# Patient Record
Sex: FEMALE | Race: WHITE | NOT HISPANIC OR LATINO | URBAN - METROPOLITAN AREA
[De-identification: names, ages, dates, MRNs, and addresses within clinical notes are randomized per-mention and may not be internally consistent; named-entity substitution may affect disease eponyms.]

---

## 2017-04-12 ENCOUNTER — OUTPATIENT (OUTPATIENT)
Dept: OUTPATIENT SERVICES | Facility: HOSPITAL | Age: 22
LOS: 1 days | End: 2017-04-12
Payer: COMMERCIAL

## 2017-04-12 VITALS
OXYGEN SATURATION: 99 % | DIASTOLIC BLOOD PRESSURE: 79 MMHG | TEMPERATURE: 98 F | WEIGHT: 115.08 LBS | RESPIRATION RATE: 17 BRPM | HEIGHT: 62 IN | SYSTOLIC BLOOD PRESSURE: 125 MMHG | HEART RATE: 91 BPM

## 2017-04-12 DIAGNOSIS — Z01.818 ENCOUNTER FOR OTHER PREPROCEDURAL EXAMINATION: ICD-10-CM

## 2017-04-12 DIAGNOSIS — Z90.89 ACQUIRED ABSENCE OF OTHER ORGANS: Chronic | ICD-10-CM

## 2017-04-12 DIAGNOSIS — S62.609A FRACTURE OF UNSPECIFIED PHALANX OF UNSPECIFIED FINGER, INITIAL ENCOUNTER FOR CLOSED FRACTURE: ICD-10-CM

## 2017-04-12 DIAGNOSIS — S62.607A FRACTURE OF UNSPECIFIED PHALANX OF LEFT LITTLE FINGER, INITIAL ENCOUNTER FOR CLOSED FRACTURE: ICD-10-CM

## 2017-04-12 DIAGNOSIS — O03.9 COMPLETE OR UNSPECIFIED SPONTANEOUS ABORTION WITHOUT COMPLICATION: Chronic | ICD-10-CM

## 2017-04-12 DIAGNOSIS — S62.605A FRACTURE OF UNSPECIFIED PHALANX OF LEFT RING FINGER, INITIAL ENCOUNTER FOR CLOSED FRACTURE: ICD-10-CM

## 2017-04-12 DIAGNOSIS — Z87.42 PERSONAL HISTORY OF OTHER DISEASES OF THE FEMALE GENITAL TRACT: Chronic | ICD-10-CM

## 2017-04-12 LAB
HCT VFR BLD CALC: 40.6 % — SIGNIFICANT CHANGE UP (ref 34.5–45)
HGB BLD-MCNC: 13.9 G/DL — SIGNIFICANT CHANGE UP (ref 11.5–15.5)
MCHC RBC-ENTMCNC: 30.1 PG — SIGNIFICANT CHANGE UP (ref 27–34)
MCHC RBC-ENTMCNC: 34.2 GM/DL — SIGNIFICANT CHANGE UP (ref 32–36)
MCV RBC AUTO: 88 FL — SIGNIFICANT CHANGE UP (ref 80–100)
PLATELET # BLD AUTO: 255 K/UL — SIGNIFICANT CHANGE UP (ref 150–400)
RBC # BLD: 4.61 M/UL — SIGNIFICANT CHANGE UP (ref 3.8–5.2)
RBC # FLD: 11.6 % — SIGNIFICANT CHANGE UP (ref 10.3–14.5)
WBC # BLD: 10.5 K/UL — SIGNIFICANT CHANGE UP (ref 3.8–10.5)
WBC # FLD AUTO: 10.5 K/UL — SIGNIFICANT CHANGE UP (ref 3.8–10.5)

## 2017-04-12 PROCEDURE — 85027 COMPLETE CBC AUTOMATED: CPT

## 2017-04-12 PROCEDURE — 36415 COLL VENOUS BLD VENIPUNCTURE: CPT

## 2017-04-12 PROCEDURE — G0463: CPT

## 2017-04-12 NOTE — H&P PST ADULT - FAMILY HISTORY
Grandparent  Still living? Unknown  Family history of diabetes mellitus, Age at diagnosis: Age Unknown     Father  Still living? Unknown  Family history of bacterial meningitis, Age at diagnosis: Age Unknown  Family history of high cholesterol, Age at diagnosis: Age Unknown

## 2017-04-12 NOTE — H&P PST ADULT - NSANTHOSAYNRD_GEN_A_CORE
No. ERINN screening performed.  STOP BANG Legend: 0-2 = LOW Risk; 3-4 = INTERMEDIATE Risk; 5-8 = HIGH Risk

## 2017-04-12 NOTE — H&P PST ADULT - PMH
Anxiety    Depression    Finger fracture, left  left 4th and 5th  PTSD (post-traumatic stress disorder)

## 2017-04-12 NOTE — H&P PST ADULT - PROBLEM SELECTOR PLAN 1
"closed reduction internal fixation possible open reduction internal fixation left ring and little finger c-arm" on 4/14/17 with Erick Jeffery MD.  Pre-op instructions reviewed with patient, boyfriend, and grandfather, and signed by patient  Immunization records to be obtained from [place].

## 2017-04-12 NOTE — H&P PST ADULT - ADDITIONAL PE
soft cast and ace wrapping up to 1st metatarsal joint of ring and little finger of the left hand with left arm sling.  Fractured 4th and 5th left ringers
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
## 2017-04-12 NOTE — H&P PST ADULT - MUSCULOSKELETAL COMMENTS
bilateral knees painful with long walks, joint swelling to left wrist with limited ROM, fractured left 4th and 5th finger left wrist swelling with limited ROM due to pain and immobilizing cast

## 2017-04-12 NOTE — H&P PST ADULT - NEGATIVE ENMT SYMPTOMS
no dysphagia/no ear pain/no hearing difficulty/no recurrent cold sores/no abnormal taste sensation/no nasal congestion/no nasal discharge/no post-nasal discharge/no throat pain/no vertigo/no tinnitus/no nasal obstruction/no nose bleeds/no gum bleeding/no dry mouth/no sinus symptoms

## 2017-04-12 NOTE — H&P PST ADULT - MUSCULOSKELETAL
details… detailed exam diminished strength/no calf tenderness/joint swelling/decreased ROM due to pain

## 2017-04-13 NOTE — ASU DISCHARGE PLAN (ADULT/PEDIATRIC). - MEDICATION SUMMARY - MEDICATIONS TO TAKE
I will START or STAY ON the medications listed below when I get home from the hospital:    acetaminophen-oxyCODONE 325 mg-5 mg oral tablet  -- 1 tab(s) by mouth every 6 hours, As Needed - for -for severe pain MDD:6 tabs  -- Caution federal law prohibits the transfer of this drug to any person other  than the person for whom it was prescribed.  May cause drowsiness.  Alcohol may intensify this effect.  Use care when operating dangerous machinery.  This prescription cannot be refilled.  This product contains acetaminophen.  Do not use  with any other product containing acetaminophen to prevent possible liver damage.  Using more of this medication than prescribed may cause serious breathing problems.    -- Indication: For severe pain as needed    Sronyx 100 mcg-20 mcg oral tablet  -- 1 tab(s) by mouth once a day  -- Indication: For birth control

## 2017-04-13 NOTE — ASU DISCHARGE PLAN (ADULT/PEDIATRIC). - SPECIAL INSTRUCTIONS
apply ice to operative site 20 minutes on and 20 minutes off for next 48 hours  Pain meds as per MD  Take an over the counter stool softener like Colace to avoid constipation while taking a narcotic for pain apply ice to operative site 20 minutes on and 20 minutes off for next 48 hours  use your sling to keep your arm elevated to prevent swelling  Use your prescribed medication as needed for severe pain, use anti-inflammatories such as advil aleve or motrin for mild-moderate pain.   Pain meds as per MD  Take an over the counter stool softener like Colace to avoid constipation while taking a narcotic for pain

## 2017-04-13 NOTE — ASU PATIENT PROFILE, ADULT - PSH
History of termination of pregnancy  1/2017  S/P tonsillectomy  2015 H/O elbow surgery  left  History of termination of pregnancy  1/2017  S/P tonsillectomy  2015

## 2017-04-13 NOTE — ASU DISCHARGE PLAN (ADULT/PEDIATRIC). - NOTIFY
Numbness, color, or temperature change to extremity/Fever greater than 101/Pain not relieved by Medications

## 2017-04-13 NOTE — ASU DISCHARGE PLAN (ADULT/PEDIATRIC). - MEDICATION SUMMARY - MEDICATIONS TO STOP TAKING
I will STOP taking the medications listed below when I get home from the hospital:    multivitamin  --

## 2017-04-14 ENCOUNTER — OUTPATIENT (OUTPATIENT)
Dept: OUTPATIENT SERVICES | Facility: HOSPITAL | Age: 22
LOS: 1 days | End: 2017-04-14
Payer: COMMERCIAL

## 2017-04-14 ENCOUNTER — TRANSCRIPTION ENCOUNTER (OUTPATIENT)
Age: 22
End: 2017-04-14

## 2017-04-14 VITALS
HEIGHT: 68.5 IN | HEART RATE: 77 BPM | DIASTOLIC BLOOD PRESSURE: 73 MMHG | SYSTOLIC BLOOD PRESSURE: 116 MMHG | TEMPERATURE: 98 F | WEIGHT: 117.29 LBS | OXYGEN SATURATION: 100 % | RESPIRATION RATE: 13 BRPM

## 2017-04-14 VITALS
HEART RATE: 80 BPM | SYSTOLIC BLOOD PRESSURE: 116 MMHG | RESPIRATION RATE: 15 BRPM | OXYGEN SATURATION: 98 % | DIASTOLIC BLOOD PRESSURE: 78 MMHG

## 2017-04-14 DIAGNOSIS — S62.607A FRACTURE OF UNSPECIFIED PHALANX OF LEFT LITTLE FINGER, INITIAL ENCOUNTER FOR CLOSED FRACTURE: ICD-10-CM

## 2017-04-14 DIAGNOSIS — Z01.818 ENCOUNTER FOR OTHER PREPROCEDURAL EXAMINATION: ICD-10-CM

## 2017-04-14 DIAGNOSIS — Z87.42 PERSONAL HISTORY OF OTHER DISEASES OF THE FEMALE GENITAL TRACT: Chronic | ICD-10-CM

## 2017-04-14 DIAGNOSIS — Z90.89 ACQUIRED ABSENCE OF OTHER ORGANS: Chronic | ICD-10-CM

## 2017-04-14 DIAGNOSIS — S62.605A FRACTURE OF UNSPECIFIED PHALANX OF LEFT RING FINGER, INITIAL ENCOUNTER FOR CLOSED FRACTURE: ICD-10-CM

## 2017-04-14 DIAGNOSIS — Z98.890 OTHER SPECIFIED POSTPROCEDURAL STATES: Chronic | ICD-10-CM

## 2017-04-14 LAB — HCG UR QL: NEGATIVE — SIGNIFICANT CHANGE UP

## 2017-04-14 PROCEDURE — 26725 TREAT FINGER FRACTURE EACH: CPT | Mod: F4

## 2017-04-14 PROCEDURE — C1713: CPT

## 2017-04-14 PROCEDURE — 76000 FLUOROSCOPY <1 HR PHYS/QHP: CPT

## 2017-04-14 PROCEDURE — 26735 TREAT FINGER FRACTURE EACH: CPT | Mod: F3

## 2017-04-14 PROCEDURE — 81025 URINE PREGNANCY TEST: CPT

## 2017-04-14 RX ORDER — CEFAZOLIN SODIUM 1 G
2000 VIAL (EA) INJECTION ONCE
Qty: 0 | Refills: 0 | Status: COMPLETED | OUTPATIENT
Start: 2017-04-14 | End: 2017-04-14

## 2017-04-14 RX ORDER — OXYCODONE HYDROCHLORIDE 5 MG/1
1 TABLET ORAL
Qty: 5 | Refills: 0 | OUTPATIENT
Start: 2017-04-14

## 2017-04-14 RX ORDER — SODIUM CHLORIDE 9 MG/ML
1000 INJECTION, SOLUTION INTRAVENOUS
Qty: 0 | Refills: 0 | Status: DISCONTINUED | OUTPATIENT
Start: 2017-04-14 | End: 2017-04-14

## 2017-04-14 RX ORDER — ONDANSETRON 8 MG/1
4 TABLET, FILM COATED ORAL ONCE
Qty: 0 | Refills: 0 | Status: DISCONTINUED | OUTPATIENT
Start: 2017-04-14 | End: 2017-04-14

## 2017-04-14 RX ORDER — HYDROMORPHONE HYDROCHLORIDE 2 MG/ML
0.5 INJECTION INTRAMUSCULAR; INTRAVENOUS; SUBCUTANEOUS
Qty: 0 | Refills: 0 | Status: DISCONTINUED | OUTPATIENT
Start: 2017-04-14 | End: 2017-04-14

## 2017-04-14 RX ADMIN — HYDROMORPHONE HYDROCHLORIDE 0.5 MILLIGRAM(S): 2 INJECTION INTRAMUSCULAR; INTRAVENOUS; SUBCUTANEOUS at 09:23

## 2017-04-14 RX ADMIN — SODIUM CHLORIDE 100 MILLILITER(S): 9 INJECTION, SOLUTION INTRAVENOUS at 10:07

## 2017-04-14 RX ADMIN — HYDROMORPHONE HYDROCHLORIDE 0.5 MILLIGRAM(S): 2 INJECTION INTRAMUSCULAR; INTRAVENOUS; SUBCUTANEOUS at 09:48

## 2017-04-14 NOTE — BRIEF OPERATIVE NOTE - PROCEDURE
Open reduction and internal fixation (ORIF) of fracture of proximal phalanx  04/14/2017  left 4th finger  Active  Meet Duran

## 2018-01-22 ENCOUNTER — APPOINTMENT (OUTPATIENT)
Dept: PEDIATRICS | Facility: CLINIC | Age: 23
End: 2018-01-22
Payer: COMMERCIAL

## 2018-01-22 VITALS — TEMPERATURE: 96.3 F

## 2018-01-22 DIAGNOSIS — R52 PAIN, UNSPECIFIED: ICD-10-CM

## 2018-01-22 DIAGNOSIS — Z78.9 OTHER SPECIFIED HEALTH STATUS: ICD-10-CM

## 2018-01-22 DIAGNOSIS — J02.9 ACUTE PHARYNGITIS, UNSPECIFIED: ICD-10-CM

## 2018-01-22 DIAGNOSIS — R50.9 FEVER, UNSPECIFIED: ICD-10-CM

## 2018-01-22 DIAGNOSIS — J06.9 ACUTE UPPER RESPIRATORY INFECTION, UNSPECIFIED: ICD-10-CM

## 2018-01-22 DIAGNOSIS — Z83.1 FAMILY HISTORY OF OTHER INFECTIOUS AND PARASITIC DISEASES: ICD-10-CM

## 2018-01-22 DIAGNOSIS — Z87.448 PERSONAL HISTORY OF OTHER DISEASES OF URINARY SYSTEM: ICD-10-CM

## 2018-01-22 LAB
FLUAV SPEC QL CULT: NORMAL
FLUBV AG SPEC QL IA: NORMAL
S PYO AG SPEC QL IA: NORMAL

## 2018-01-22 PROCEDURE — 87804 INFLUENZA ASSAY W/OPTIC: CPT | Mod: 59,QW

## 2018-01-22 PROCEDURE — 87880 STREP A ASSAY W/OPTIC: CPT | Mod: QW

## 2018-01-22 PROCEDURE — 99204 OFFICE O/P NEW MOD 45 MIN: CPT | Mod: 25

## 2018-01-22 RX ORDER — LEVONORGESTREL AND ETHINYL ESTRADIOL 0.1-0.02MG
0.1-2 KIT ORAL
Qty: 84 | Refills: 0 | Status: ACTIVE | COMMUNITY
Start: 2017-12-26

## 2018-01-25 LAB — BACTERIA THROAT CULT: NORMAL

## 2018-07-16 PROBLEM — S62.609A FRACTURE OF UNSPECIFIED PHALANX OF UNSPECIFIED FINGER, INITIAL ENCOUNTER FOR CLOSED FRACTURE: Chronic | Status: ACTIVE | Noted: 2017-04-12

## 2019-03-21 PROBLEM — F32.9 MAJOR DEPRESSIVE DISORDER, SINGLE EPISODE, UNSPECIFIED: Chronic | Status: ACTIVE | Noted: 2017-04-12

## 2019-03-21 PROBLEM — J45.909 UNSPECIFIED ASTHMA, UNCOMPLICATED: Chronic | Status: ACTIVE | Noted: 2017-04-14

## 2019-03-21 PROBLEM — F43.10 POST-TRAUMATIC STRESS DISORDER, UNSPECIFIED: Chronic | Status: ACTIVE | Noted: 2017-04-12

## 2019-03-21 PROBLEM — F41.9 ANXIETY DISORDER, UNSPECIFIED: Chronic | Status: ACTIVE | Noted: 2017-04-12

## 2019-04-17 ENCOUNTER — LABORATORY RESULT (OUTPATIENT)
Age: 24
End: 2019-04-17

## 2019-04-17 ENCOUNTER — APPOINTMENT (OUTPATIENT)
Dept: RHEUMATOLOGY | Facility: CLINIC | Age: 24
End: 2019-04-17
Payer: COMMERCIAL

## 2019-04-17 VITALS
HEIGHT: 61 IN | OXYGEN SATURATION: 97 % | DIASTOLIC BLOOD PRESSURE: 76 MMHG | WEIGHT: 136 LBS | BODY MASS INDEX: 25.68 KG/M2 | HEART RATE: 70 BPM | RESPIRATION RATE: 14 BRPM | SYSTOLIC BLOOD PRESSURE: 111 MMHG

## 2019-04-17 DIAGNOSIS — Z87.09 PERSONAL HISTORY OF OTHER DISEASES OF THE RESPIRATORY SYSTEM: ICD-10-CM

## 2019-04-17 DIAGNOSIS — Z87.2 PERSONAL HISTORY OF DISEASES OF THE SKIN AND SUBCUTANEOUS TISSUE: ICD-10-CM

## 2019-04-17 PROCEDURE — 99205 OFFICE O/P NEW HI 60 MIN: CPT

## 2019-04-18 LAB
ALBUMIN SERPL ELPH-MCNC: 4.8 G/DL
ALP BLD-CCNC: 56 U/L
ALT SERPL-CCNC: 17 U/L
ANION GAP SERPL CALC-SCNC: 13 MMOL/L
APPEARANCE: CLEAR
APTT IMM NP/PRE NP PPP: NORMAL
APTT INV RATIO PPP: 27.5 SEC
AST SERPL-CCNC: 16 U/L
BACTERIA: NEGATIVE
BASOPHILS # BLD AUTO: 0.09 K/UL
BASOPHILS NFR BLD AUTO: 1 %
BILIRUB SERPL-MCNC: 0.2 MG/DL
BILIRUBIN URINE: NEGATIVE
BLOOD URINE: NEGATIVE
BUN SERPL-MCNC: 11 MG/DL
CALCIUM SERPL-MCNC: 9.7 MG/DL
CHLORIDE SERPL-SCNC: 107 MMOL/L
CK SERPL-CCNC: 68 U/L
CO2 SERPL-SCNC: 21 MMOL/L
COLOR: NORMAL
CREAT SERPL-MCNC: 0.55 MG/DL
CRP SERPL-MCNC: 0.12 MG/DL
EOSINOPHIL # BLD AUTO: 0.64 K/UL
EOSINOPHIL NFR BLD AUTO: 6.9 %
ERYTHROCYTE [SEDIMENTATION RATE] IN BLOOD BY WESTERGREN METHOD: 5 MM/HR
GLUCOSE QUALITATIVE U: NEGATIVE
HCT VFR BLD CALC: 40.6 %
HGB BLD-MCNC: 13.1 G/DL
HYALINE CASTS: 0 /LPF
IMM GRANULOCYTES NFR BLD AUTO: 0.2 %
KETONES URINE: NEGATIVE
LEUKOCYTE ESTERASE URINE: NEGATIVE
LYMPHOCYTES # BLD AUTO: 2.06 K/UL
LYMPHOCYTES NFR BLD AUTO: 22.1 %
MAN DIFF?: NORMAL
MCHC RBC-ENTMCNC: 27.6 PG
MCHC RBC-ENTMCNC: 32.3 GM/DL
MCV RBC AUTO: 85.5 FL
MICROSCOPIC-UA: NORMAL
MONOCYTES # BLD AUTO: 0.78 K/UL
MONOCYTES NFR BLD AUTO: 8.4 %
NEUTROPHILS # BLD AUTO: 5.74 K/UL
NEUTROPHILS NFR BLD AUTO: 61.4 %
NITRITE URINE: NEGATIVE
NPP NORMAL POOLED PLASMA: NORMAL SECS
PH URINE: 6
PLATELET # BLD AUTO: 261 K/UL
POTASSIUM SERPL-SCNC: 4.5 MMOL/L
PROT SERPL-MCNC: 7.3 G/DL
PROTEIN URINE: NEGATIVE
RBC # BLD: 4.75 M/UL
RBC # FLD: 16.1 %
RED BLOOD CELLS URINE: 4 /HPF
RHEUMATOID FACT SER QL: <10 IU/ML
SODIUM SERPL-SCNC: 141 MMOL/L
SPECIFIC GRAVITY URINE: 1.03
SQUAMOUS EPITHELIAL CELLS: 3 /HPF
UROBILINOGEN URINE: NORMAL
WBC # FLD AUTO: 9.33 K/UL
WHITE BLOOD CELLS URINE: 1 /HPF
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
## 2019-04-18 NOTE — REASON FOR VISIT
[Consultation] : a consultation visit [Other: _____] : [unfilled] [FreeTextEntry1] : PCP - Dr. Sylvester ([facility] on [facility]) - [facility] pediatrics - [address] [facility]

## 2019-04-18 NOTE — ASSESSMENT
[FreeTextEntry1] : 22 yo F pmhx asthma, eczema here for evaluation for lupus given arthralgias, hair loss, malar rash\par \par #joint pain, hair loss, photosensitivity and malar rash by history\par -check serologies for inflammatory autoimmune ctd\par \par # back pain - chronic \par - check xray\par - may need MRI\par \par #frequent infxn and fhx of severe infections (meningitis)\par - check ig\par - check ch50\par \par all questions answered

## 2019-04-18 NOTE — PHYSICAL EXAM
[General Appearance - Alert] : alert [General Appearance - In No Acute Distress] : in no acute distress [General Appearance - Well Nourished] : well nourished [General Appearance - Well Developed] : well developed [General Appearance - Well-Appearing] : healthy appearing [Sclera] : the sclera and conjunctiva were normal [PERRL With Normal Accommodation] : pupils were equal in size, round, and reactive to light [Outer Ear] : the ears and nose were normal in appearance [Extraocular Movements] : extraocular movements were intact [Neck Appearance] : the appearance of the neck was normal [Neck Cervical Mass (___cm)] : no neck mass was observed [Oropharynx] : the oropharynx was normal [Thyroid Diffuse Enlargement] : the thyroid was not enlarged [Jugular Venous Distention Increased] : there was no jugular-venous distention [Thyroid Nodule] : there were no palpable thyroid nodules [Auscultation Breath Sounds / Voice Sounds] : lungs were clear to auscultation bilaterally [Heart Rate And Rhythm] : heart rate was normal and rhythm regular [Heart Sounds Gallop] : no gallops [Heart Sounds] : normal S1 and S2 [Murmurs] : no murmurs [Heart Sounds Pericardial Friction Rub] : no pericardial rub [Veins - Varicosity Changes] : there were no varicosital changes [Edema] : there was no peripheral edema [Cervical Lymph Nodes Enlarged Posterior Bilaterally] : posterior cervical [Cervical Lymph Nodes Enlarged Anterior Bilaterally] : anterior cervical [Supraclavicular Lymph Nodes Enlarged Bilaterally] : supraclavicular [Axillary Lymph Nodes Enlarged Bilaterally] : axillary [No Spinal Tenderness] : no spinal tenderness [No CVA Tenderness] : no ~M costovertebral angle tenderness [Nail Clubbing] : no clubbing  or cyanosis of the fingernails [Abnormal Walk] : normal gait [Musculoskeletal - Swelling] : no joint swelling seen [Motor Tone] : muscle strength and tone were normal [Skin Color & Pigmentation] : normal skin color and pigmentation [Skin Turgor] : normal skin turgor [Skin Lesions] : no skin lesions [] : no rash [Deep Tendon Reflexes (DTR)] : deep tendon reflexes were 2+ and symmetric [Sensation] : the sensory exam was normal to light touch and pinprick [Motor Exam] : the motor exam was normal [No Focal Deficits] : no focal deficits [Oriented To Time, Place, And Person] : oriented to person, place, and time [Impaired Insight] : insight and judgment were intact [Affect] : the affect was normal [Mood] : the mood was normal

## 2019-04-19 LAB
C3 SERPL-MCNC: 113 MG/DL
C4 SERPL-MCNC: 17 MG/DL
CH50 SERPL-MCNC: 38 U/ML
DEPRECATED KAPPA LC FREE/LAMBDA SER: 1.08 RATIO
DSDNA AB SER-ACNC: <12 IU/ML
ENA RNP AB SER IA-ACNC: 0.5 AL
ENA SM AB SER IA-ACNC: <0.2 AL
ENA SS-A AB SER IA-ACNC: <0.2 AL
ENA SS-B AB SER IA-ACNC: <0.2 AL
IGA SER QL IEP: 120 MG/DL
IGG SER QL IEP: 1126 MG/DL
IGM SER QL IEP: 214 MG/DL
KAPPA LC CSF-MCNC: 1 MG/DL
KAPPA LC SERPL-MCNC: 1.08 MG/DL
THYROGLOB AB SERPL-ACNC: <20 IU/ML
THYROPEROXIDASE AB SERPL IA-ACNC: <10 IU/ML

## 2019-04-22 LAB
CCP AB SER IA-ACNC: <8 UNITS
RF+CCP IGG SER-IMP: NEGATIVE

## 2019-04-29 ENCOUNTER — TRANSCRIPTION ENCOUNTER (OUTPATIENT)
Age: 24
End: 2019-04-29

## 2019-04-30 ENCOUNTER — MESSAGE (OUTPATIENT)
Age: 24
End: 2019-04-30

## 2019-05-10 ENCOUNTER — APPOINTMENT (OUTPATIENT)
Dept: RHEUMATOLOGY | Facility: CLINIC | Age: 24
End: 2019-05-10
Payer: COMMERCIAL

## 2019-05-10 VITALS
HEART RATE: 81 BPM | BODY MASS INDEX: 25.68 KG/M2 | OXYGEN SATURATION: 97 % | HEIGHT: 61 IN | WEIGHT: 136 LBS | SYSTOLIC BLOOD PRESSURE: 111 MMHG | DIASTOLIC BLOOD PRESSURE: 79 MMHG

## 2019-05-10 DIAGNOSIS — D84.1 DEFECTS IN THE COMPLEMENT SYSTEM: ICD-10-CM

## 2019-05-10 DIAGNOSIS — M25.50 PAIN IN UNSPECIFIED JOINT: ICD-10-CM

## 2019-05-10 PROCEDURE — 99214 OFFICE O/P EST MOD 30 MIN: CPT

## 2019-05-10 NOTE — REASON FOR VISIT
[Other: _____] : [unfilled] [FreeTextEntry1] : PCP - Dr. Sylvester (kvng on [facility]) - [facility] pediatrics - [address] [facility]
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
## 2019-05-10 NOTE — REVIEW OF SYSTEMS
[As Noted in HPI] : as noted in HPI [Negative] : Heme/Lymph [FreeTextEntry6] : hx of asthma - cold induced and worse in the winter

## 2019-05-10 NOTE — CONSULT LETTER
[Dear  ___] : Dear  [unfilled], [Consult Letter:] : I had the pleasure of evaluating your patient, [unfilled]. [Consult Closing:] : Thank you very much for allowing me to participate in the care of this patient.  If you have any questions, please do not hesitate to contact me. [Please see my note below.] : Please see my note below. [Sincerely,] : Sincerely, [FreeTextEntry2] : PCP - Dr. Sylvester ([facility]) - [facility] pediatrics - [address] [facility]

## 2019-05-10 NOTE — PHYSICAL EXAM
[General Appearance - Alert] : alert [General Appearance - In No Acute Distress] : in no acute distress [General Appearance - Well Nourished] : well nourished [General Appearance - Well Developed] : well developed [General Appearance - Well-Appearing] : healthy appearing [Sclera] : the sclera and conjunctiva were normal [Extraocular Movements] : extraocular movements were intact [PERRL With Normal Accommodation] : pupils were equal in size, round, and reactive to light [Outer Ear] : the ears and nose were normal in appearance [Oropharynx] : the oropharynx was normal [Neck Appearance] : the appearance of the neck was normal [Neck Cervical Mass (___cm)] : no neck mass was observed [Jugular Venous Distention Increased] : there was no jugular-venous distention [Thyroid Diffuse Enlargement] : the thyroid was not enlarged [Thyroid Nodule] : there were no palpable thyroid nodules [Auscultation Breath Sounds / Voice Sounds] : lungs were clear to auscultation bilaterally [Heart Rate And Rhythm] : heart rate was normal and rhythm regular [Heart Sounds] : normal S1 and S2 [Heart Sounds Gallop] : no gallops [Murmurs] : no murmurs [Heart Sounds Pericardial Friction Rub] : no pericardial rub [Edema] : there was no peripheral edema [Veins - Varicosity Changes] : there were no varicosital changes [Cervical Lymph Nodes Enlarged Posterior Bilaterally] : posterior cervical [Cervical Lymph Nodes Enlarged Anterior Bilaterally] : anterior cervical [Supraclavicular Lymph Nodes Enlarged Bilaterally] : supraclavicular [Axillary Lymph Nodes Enlarged Bilaterally] : axillary [No CVA Tenderness] : no ~M costovertebral angle tenderness [Abnormal Walk] : normal gait [Nail Clubbing] : no clubbing  or cyanosis of the fingernails [Motor Tone] : muscle strength and tone were normal [Musculoskeletal - Swelling] : no joint swelling seen [Skin Color & Pigmentation] : normal skin color and pigmentation [Skin Turgor] : normal skin turgor [Skin Lesions] : no skin lesions [] : no rash [No Focal Deficits] : no focal deficits [Oriented To Time, Place, And Person] : oriented to person, place, and time [Impaired Insight] : insight and judgment were intact [Affect] : the affect was normal [Mood] : the mood was normal [FreeTextEntry1] : pain with palpation of the lumbar spine and si joint on the left

## 2019-05-10 NOTE — DATA REVIEWED
[FreeTextEntry1] : 4.17.19 labs\par CMP, PTT, CPK, CRP, ESR, C3, C4, immunoglobulins, TSH normal\par CBC normal with slightly elevated eosiinophils (hx of asthma)\par Neg, dsDNAENA, SSA, SSB,  RF, thyroid ab, ccp\par slightly low CH50\par

## 2019-05-13 LAB
C1INH SERPL-MCNC: 27 MG/DL
C3 SERPL-MCNC: 122 MG/DL
C4 SERPL-MCNC: 17 MG/DL
CRYOGLOB SERPL-MCNC: NEGATIVE

## 2019-05-15 LAB
ANA SER IF-ACNC: NEGATIVE
C1Q SERPL-MCNC: 21 MG/DL
CH50 SERPL-MCNC: 37 U/ML
COMPLEMENT, ALTERNATE PATHWAY (AH50): 69

## 2019-05-21 LAB — C2 SERPL-MCNC: 3.4 MG/DL

## 2019-05-31 ENCOUNTER — APPOINTMENT (OUTPATIENT)
Dept: RHEUMATOLOGY | Facility: CLINIC | Age: 24
End: 2019-05-31

## 2019-06-04 ENCOUNTER — APPOINTMENT (OUTPATIENT)
Dept: RHEUMATOLOGY | Facility: CLINIC | Age: 24
End: 2019-06-04

## 2019-06-11 ENCOUNTER — APPOINTMENT (OUTPATIENT)
Dept: RHEUMATOLOGY | Facility: CLINIC | Age: 24
End: 2019-06-11

## 2019-06-17 ENCOUNTER — APPOINTMENT (OUTPATIENT)
Dept: RHEUMATOLOGY | Facility: CLINIC | Age: 24
End: 2019-06-17

## 2019-06-18 ENCOUNTER — APPOINTMENT (OUTPATIENT)
Dept: RHEUMATOLOGY | Facility: CLINIC | Age: 24
End: 2019-06-18
Payer: COMMERCIAL

## 2019-06-18 VITALS
SYSTOLIC BLOOD PRESSURE: 104 MMHG | HEART RATE: 80 BPM | BODY MASS INDEX: 24.84 KG/M2 | OXYGEN SATURATION: 99 % | DIASTOLIC BLOOD PRESSURE: 71 MMHG | WEIGHT: 135 LBS | HEIGHT: 62 IN

## 2019-06-18 DIAGNOSIS — G89.29 LOW BACK PAIN: ICD-10-CM

## 2019-06-18 DIAGNOSIS — M62.838 OTHER MUSCLE SPASM: ICD-10-CM

## 2019-06-18 DIAGNOSIS — M54.5 LOW BACK PAIN: ICD-10-CM

## 2019-06-18 PROCEDURE — 99214 OFFICE O/P EST MOD 30 MIN: CPT

## 2019-06-18 RX ORDER — CYCLOBENZAPRINE HYDROCHLORIDE 5 MG/1
5 TABLET, FILM COATED ORAL
Qty: 10 | Refills: 0 | Status: ACTIVE | COMMUNITY
Start: 2019-06-18 | End: 1900-01-01

## 2019-06-18 RX ORDER — NAPROXEN 500 MG/1
500 TABLET ORAL
Qty: 60 | Refills: 2 | Status: ACTIVE | COMMUNITY
Start: 2019-06-18 | End: 1900-01-01

## 2019-06-22 PROBLEM — M62.838 MUSCLE SPASM: Status: ACTIVE | Noted: 2019-06-22
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
## 2019-06-22 NOTE — PHYSICAL EXAM
[General Appearance - Alert] : alert [General Appearance - In No Acute Distress] : in no acute distress [General Appearance - Well Nourished] : well nourished [General Appearance - Well Developed] : well developed [General Appearance - Well-Appearing] : healthy appearing [Sclera] : the sclera and conjunctiva were normal [PERRL With Normal Accommodation] : pupils were equal in size, round, and reactive to light [Extraocular Movements] : extraocular movements were intact [Outer Ear] : the ears and nose were normal in appearance [Oropharynx] : the oropharynx was normal [Neck Appearance] : the appearance of the neck was normal [Neck Cervical Mass (___cm)] : no neck mass was observed [Jugular Venous Distention Increased] : there was no jugular-venous distention [Thyroid Diffuse Enlargement] : the thyroid was not enlarged [Thyroid Nodule] : there were no palpable thyroid nodules [Auscultation Breath Sounds / Voice Sounds] : lungs were clear to auscultation bilaterally [Heart Rate And Rhythm] : heart rate was normal and rhythm regular [Heart Sounds] : normal S1 and S2 [Heart Sounds Gallop] : no gallops [Murmurs] : no murmurs [Heart Sounds Pericardial Friction Rub] : no pericardial rub [Edema] : there was no peripheral edema [Veins - Varicosity Changes] : there were no varicosital changes [Cervical Lymph Nodes Enlarged Posterior Bilaterally] : posterior cervical [Cervical Lymph Nodes Enlarged Anterior Bilaterally] : anterior cervical [Supraclavicular Lymph Nodes Enlarged Bilaterally] : supraclavicular [Axillary Lymph Nodes Enlarged Bilaterally] : axillary [No CVA Tenderness] : no ~M costovertebral angle tenderness [Abnormal Walk] : normal gait [Nail Clubbing] : no clubbing  or cyanosis of the fingernails [Musculoskeletal - Swelling] : no joint swelling seen [Motor Tone] : muscle strength and tone were normal [Skin Color & Pigmentation] : normal skin color and pigmentation [Skin Turgor] : normal skin turgor [] : no rash [Skin Lesions] : no skin lesions [No Focal Deficits] : no focal deficits [Oriented To Time, Place, And Person] : oriented to person, place, and time [Impaired Insight] : insight and judgment were intact [Affect] : the affect was normal [Mood] : the mood was normal [FreeTextEntry1] : pain with palpation of the lumbar spine and si joint on the left - left paraspinal muscle tenderness and spasm

## 2019-06-22 NOTE — REASON FOR VISIT
[Other: _____] : [unfilled] [FreeTextEntry1] : PCP - Dr. Sylvester (kvng on [facility]) - [facility] pediatrics - [address] [facility]
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
## 2019-07-09 ENCOUNTER — APPOINTMENT (OUTPATIENT)
Dept: RHEUMATOLOGY | Facility: CLINIC | Age: 24
End: 2019-07-09

## 2019-08-08 ENCOUNTER — TRANSCRIPTION ENCOUNTER (OUTPATIENT)
Age: 24
End: 2019-08-08

## 2020-03-10 ENCOUNTER — TRANSCRIPTION ENCOUNTER (OUTPATIENT)
Age: 25
End: 2020-03-10

## 2020-05-17 ENCOUNTER — TRANSCRIPTION ENCOUNTER (OUTPATIENT)
Age: 25
End: 2020-05-17

## 2020-06-17 ENCOUNTER — EMERGENCY (EMERGENCY)
Facility: HOSPITAL | Age: 25
LOS: 1 days | Discharge: HOME | End: 2020-06-17
Attending: EMERGENCY MEDICINE | Admitting: EMERGENCY MEDICINE
Payer: COMMERCIAL

## 2020-06-17 VITALS
RESPIRATION RATE: 18 BRPM | TEMPERATURE: 98 F | WEIGHT: 139.99 LBS | HEART RATE: 103 BPM | DIASTOLIC BLOOD PRESSURE: 79 MMHG | OXYGEN SATURATION: 98 % | SYSTOLIC BLOOD PRESSURE: 116 MMHG | HEIGHT: 61 IN

## 2020-06-17 DIAGNOSIS — J45.901 UNSPECIFIED ASTHMA WITH (ACUTE) EXACERBATION: ICD-10-CM

## 2020-06-17 DIAGNOSIS — Z87.42 PERSONAL HISTORY OF OTHER DISEASES OF THE FEMALE GENITAL TRACT: Chronic | ICD-10-CM

## 2020-06-17 DIAGNOSIS — Z98.890 OTHER SPECIFIED POSTPROCEDURAL STATES: Chronic | ICD-10-CM

## 2020-06-17 DIAGNOSIS — Z90.89 ACQUIRED ABSENCE OF OTHER ORGANS: Chronic | ICD-10-CM

## 2020-06-17 PROCEDURE — 99284 EMERGENCY DEPT VISIT MOD MDM: CPT | Mod: 25

## 2020-06-17 PROCEDURE — 99284 EMERGENCY DEPT VISIT MOD MDM: CPT

## 2020-06-17 PROCEDURE — 94640 AIRWAY INHALATION TREATMENT: CPT

## 2020-06-17 RX ORDER — IPRATROPIUM BROMIDE 0.2 MG/ML
500 SOLUTION, NON-ORAL INHALATION ONCE
Refills: 0 | Status: COMPLETED | OUTPATIENT
Start: 2020-06-17 | End: 2020-06-17

## 2020-06-17 RX ORDER — ALBUTEROL 90 UG/1
2.5 AEROSOL, METERED ORAL
Refills: 0 | Status: COMPLETED | OUTPATIENT
Start: 2020-06-17 | End: 2020-06-17

## 2020-06-17 RX ADMIN — ALBUTEROL 2.5 MILLIGRAM(S): 90 AEROSOL, METERED ORAL at 05:45

## 2020-06-17 RX ADMIN — Medication 40 MILLIGRAM(S): at 05:45

## 2020-06-17 RX ADMIN — Medication 500 MICROGRAM(S): at 05:45

## 2020-06-17 RX ADMIN — ALBUTEROL 2.5 MILLIGRAM(S): 90 AEROSOL, METERED ORAL at 05:46
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
## 2020-06-17 NOTE — ED PROVIDER NOTE - NSFOLLOWUPINSTRUCTIONS_ED_ALL_ED_FT
-continue albuterol  -take prednisone daily for 4 more days starting 6/18/20    Follow Up in 1-3 Days with your own doctor or with  [address] [address]  Phone: [phone]      Asthma    WHAT YOU NEED TO KNOW:    Asthma is a lung disease that makes breathing difficult. Chronic inflammation and reactions to triggers narrow the airways in the lungs. Asthma can become life-threatening if it is not managed.     DISCHARGE INSTRUCTIONS:    Call your local emergency number (911 in the [place]) if:     You have severe shortness of breath.     Your lips or nails turn blue or gray.     The skin around your neck and ribs pulls in with each breath.    You have shortness of breath, even after you take your short-term medicine as directed.     Your peak flow numbers are in the red zone of your AAP.     Call your doctor if:     You run out of medicine before your next refill is due.    Your symptoms get worse.     You need to take more medicine than usual to control your symptoms.     You have questions or concerns about your condition or care.    Medicines:     Medicines decrease inflammation, open airways, and make it easier to breathe. Medicines may be inhaled, taken as a pill, or injected. Short-term medicines relieve your symptoms quickly. Long-term medicines are used to prevent future attacks. You may also need medicine to help control your allergies. Ask your healthcare provider for more information about the medicine you are given and how to take it safely.    Take your medicine as directed. Contact your healthcare provider if you think your medicine is not helping or if you have side effects. Tell him of her if you are allergic to any medicine. Keep a list of the medicines, vitamins, and herbs you take. Include the amounts, and when and why you take them. Bring the list or the pill bottles to follow-up visits. Carry your medicine list with you in case of an emergency.    Follow up with your healthcare provider as directed: You will need to return to make sure your medicine is working and your symptoms are controlled. You may be referred to an asthma specialist. You may be asked to keep a record of your peak flow values and bring it with you to your appointments. Write down your questions so you remember to ask them during your visits.    Manage your symptoms and prevent future attacks:     Follow your Asthma Action Plan (AAP). This is a written plan that you and your healthcare provider create. It explains which medicine you need and when to change doses if necessary. It also explains how you can monitor symptoms and use a peak flow meter. The meter measures how well your lungs are working.     Manage other health conditions, such as allergies, acid reflux, and sleep apnea.     Identify and avoid triggers. These may include pets, dust mites, mold, and cockroaches.    Do not smoke or be around others who smoke. Nicotine and other chemicals in cigarettes and cigars can cause lung damage. Ask your healthcare provider for information if you currently smoke and need help to quit. E-cigarettes or smokeless tobacco still contain nicotine. Talk to your healthcare provider before you use these products.     Ask about the flu vaccine. The flu can make your asthma worse. You may need a yearly flu shot.

## 2020-06-17 NOTE — ED PROVIDER NOTE - PATIENT PORTAL LINK FT
You can access the FollowMyHealth Patient Portal offered by [facility] by registering at the following website: [email or web address]. By joining [facility]’s FollowMyHealth portal, you will also be able to view your health information using other applications (apps) compatible with our system.

## 2020-06-17 NOTE — ED PROVIDER NOTE - CLINICAL SUMMARY MEDICAL DECISION MAKING FREE TEXT BOX
acute wheezing/sob, h/o asthma.  +wheezing. likely asthma exacerbation.  mildly labored, no sign of resp failure.  will treat c duoneb, prednisone.  no signs of infection/covid. acute wheezing/sob, h/o asthma.  +wheezing. likely asthma exacerbation.  mildly labored, no sign of resp failure.  will treat c duoneb, prednisone.  no signs of infection/covid.    0614: pt feeling "much much better".  speaking normally, no sob. lungs with only slight scattered wheeze, much improved.

## 2020-06-17 NOTE — ED PROVIDER NOTE - OBJECTIVE STATEMENT
pt c/o asthma exacerbation, moderate, started 1 hr ago, assoc c wheezing and sob.  was well last night. no chest pain, fever, cough, rhinorrhea, myalgia. no covid exposure. not better c ventolin

## 2020-11-04 NOTE — ASU PATIENT PROFILE, ADULT - DOES PATIENT HAVE ADVANCE DIRECTIVE
[Patient Optimized for Surgery] : Patient optimized for surgery [No Further Testing Recommended] : no further testing recommended [Continue medications as is] : Continue current medications [As per surgery] : as per surgery [FreeTextEntry4] : 67 y/o M with history as noted, planned for left inguinal hernia repair. He is of low cardiac risk for non-cardiac surgery, may proceed without any further cardiac testing or workup. He is medically cleared for procedure. No

## 2020-12-16 PROBLEM — J06.9 ACUTE URI: Status: RESOLVED | Noted: 2018-01-22 | Resolved: 2020-12-16

## 2021-01-27 NOTE — H&P PST ADULT - NEGATIVE MUSCULOSKELETAL SYMPTOMS
Writer rounded on patient. Patient resting comfortably at this time, no needs expressed. Side rails up x2, call light within reach. Vitals stable and cycling at this time. Will continue to monitor.     no neck pain/no arm pain L/no arm pain R/no stiffness/no muscle weakness

## 2021-02-22 ENCOUNTER — TRANSCRIPTION ENCOUNTER (OUTPATIENT)
Age: 26
End: 2021-02-22

## 2021-03-01 ENCOUNTER — APPOINTMENT (OUTPATIENT)
Dept: ORTHOPEDIC SURGERY | Facility: CLINIC | Age: 26
End: 2021-03-01
Payer: COMMERCIAL

## 2021-03-01 PROCEDURE — 99203 OFFICE O/P NEW LOW 30 MIN: CPT

## 2021-03-01 PROCEDURE — 99072 ADDL SUPL MATRL&STAF TM PHE: CPT

## 2021-03-01 PROCEDURE — 73140 X-RAY EXAM OF FINGER(S): CPT | Mod: F8

## 2021-03-01 NOTE — ADDENDUM
[FreeTextEntry1] : I, Shayna Morales wrote this note acting as a scribe for Dr. Erick Jeffery on Mar 01, 2021.\par \par

## 2021-03-01 NOTE — DISCUSSION/SUMMARY
[FreeTextEntry1] : The underlying pathophysiology was reviewed with the patient. XR films were reviewed with the patient. Discussed at length the nature of the patient’s condition. Their right hand symptoms appear secondary to lateral fracture of the 4th digit.\par \par Treatment options were discussed including; observation, NSAIDS, analgesics, and splinting\par \par Taped middle finger to ring finger, acting as a splint.\par Recommended taking Tylenol of Ibuprofen.\par Recommended at home exercises.\par \par Patient can continue activities as tolerated. All questions answered, understanding verbalized. Patient in agreement with plan of care. Follow up in 2 weeks for repeat XR.

## 2021-03-01 NOTE — PHYSICAL EXAM
[de-identified] : Patient is WDWN, alert, and in no acute distress. Breathing is unlabored. She is grossly oriented to person, place, \par and time. \par \par Right Hand:\par Ring finger edema nad tenderness along middle phalanx\par No deformity\par Flexion is painful\par Sensation is normal.\par  [de-identified] : EXAM: FINGER RIGHT - : 02/22/2021 - 3 views of the right ring finger\par \par IMPRESSION:\par Acute nondisplaced obliquely oriented fracture of the distal aspect of the middle phalanx. No other fractures. Joint spaces are maintained.\par \par HUMBERTO ALFORD MD; Attending Radiologist\par This document has been electronically signed. Feb 22 2021 8:13PM\par \par \par New Xrays were taken today 3 views right ring finger:There is a nondisplaced oblique fracture  of the middle phalanx\par \par \par \par

## 2021-03-01 NOTE — END OF VISIT
[FreeTextEntry3] : All medical record entries made by the Scribe were at my,  Dr. Erick Jeffery MD., direction and personally dictated by me on 03/01/2021. I have personally reviewed the chart and agree that the record accurately reflects my personal performance of the history, physical exam, assessment and plan.\par \par
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
## 2021-03-15 ENCOUNTER — APPOINTMENT (OUTPATIENT)
Dept: ORTHOPEDIC SURGERY | Facility: CLINIC | Age: 26
End: 2021-03-15
Payer: COMMERCIAL

## 2021-03-15 PROCEDURE — 99213 OFFICE O/P EST LOW 20 MIN: CPT

## 2021-03-15 PROCEDURE — 99072 ADDL SUPL MATRL&STAF TM PHE: CPT

## 2021-03-15 PROCEDURE — 73130 X-RAY EXAM OF HAND: CPT | Mod: RT

## 2021-03-15 NOTE — END OF VISIT
[FreeTextEntry3] : All medical record entries made by the Scribe were at my,  Dr. Erick Jeffery MD., direction and personally dictated by me on 03/15/2021. I have personally reviewed the chart and agree that the record accurately reflects my personal performance of the history, physical exam, assessment and plan.\par \par

## 2021-03-15 NOTE — DISCUSSION/SUMMARY
[FreeTextEntry1] : The underlying pathophysiology was reviewed with the patient. XR films were reviewed with the patient. Discussed at length the nature of the patient’s condition. Their right hand symptoms appear secondary to lateral fracture of the 4th digit.\par \par Treatment options were discussed including; observation, NSAIDS, analgesics, and splinting\par \par Recommended taking Tylenol of Ibuprofen.\par Recommended at home exercises.\par Advised to return to activities as tolerated.\par \par Patient can continue activities as tolerated. All questions answered, understanding verbalized. Patient in agreement with plan of care. Follow up in 4 weeks for repeat XR.

## 2021-03-15 NOTE — HISTORY OF PRESENT ILLNESS
[FreeTextEntry1] : THIERNO BUTLER is a 25 year female presents for follow-up evaluation of the right hand middle phalanx fracture. Patient returns to the office about 2.5 weeks post injury. Upon opening and closing her fist, she experiences increased pain. Patient denies taking medication for pain.

## 2021-03-15 NOTE — PHYSICAL EXAM
[de-identified] : Patient is WDWN, alert, and in no acute distress. Breathing is unlabored. She is grossly oriented to person, place, and time. \par \par Right Hand:\par No deformity\par Flexion is painful\par Sensation is normal. [de-identified] : \par \par \par 3/15/21 -  AP, lateral and oblique views of the right hand were obtained today and revealed  a nondisplaced oblique fracture of the middle phalanx that is healing well. 		 \par

## 2021-03-24 ENCOUNTER — TRANSCRIPTION ENCOUNTER (OUTPATIENT)
Age: 26
End: 2021-03-24

## 2021-04-12 ENCOUNTER — APPOINTMENT (OUTPATIENT)
Dept: ORTHOPEDIC SURGERY | Facility: CLINIC | Age: 26
End: 2021-04-12

## 2021-04-13 NOTE — REVIEW OF SYSTEMS
Urology Urology Urology Urology Urology Urology Urology Urology Urology Urology [Joint Pain] : joint pain [Negative] : Allergic/Immunologic

## 2021-12-02 ENCOUNTER — APPOINTMENT (OUTPATIENT)
Dept: ORTHOPEDIC SURGERY | Facility: CLINIC | Age: 26
End: 2021-12-02
Payer: COMMERCIAL

## 2021-12-02 VITALS — WEIGHT: 145 LBS | HEIGHT: 62 IN | BODY MASS INDEX: 26.68 KG/M2

## 2021-12-02 PROCEDURE — 99213 OFFICE O/P EST LOW 20 MIN: CPT

## 2021-12-02 NOTE — ADDENDUM
[FreeTextEntry1] : I, Shayna Morales wrote this note acting as a scribe for Dr. Erick Jeffery on Dec 02, 2021.

## 2021-12-02 NOTE — HISTORY OF PRESENT ILLNESS
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 She had x rays taken and was placed into an aluminum splint to treat an avulsion fracture of the ulnar base of her middle phalanx. She states the pain has been improving since the injury. She was recently treated for a Right ring finger middle phalanx shaft fracture in February 2021.

## 2021-12-02 NOTE — DISCUSSION/SUMMARY
[FreeTextEntry1] : The underlying pathophysiology was reviewed with the patient. XR films were reviewed with the patient. Discussed at length the nature of the patient’s condition. The right ring finger symptoms appear secondary to fracture at the ulnar aspect of the base of the middle phalanx.\par \par The ring finger was randy taped to the long finger at this time. She was instructed to keep the fingers randy taped as the fracture heals and as long as she has pain. She may change the tape once daily.\par She was additionally instructed on ROM exercises of the ring finger into flexion and extension.\par She may soak the hand in warm water and Epsom salts. \par \par All questions answered, understanding verbalized. Patient in agreement with plan of care. Follow up in 4 weeks for repeat xrays.

## 2021-12-02 NOTE — PHYSICAL EXAM
[de-identified] : Patient is WDWN, alert, and in no acute distress. Breathing is unlabored. She is grossly oriented to person, place, and time.\par \par Right Hand (ring finger):\par Tenderness to palpation over the PIP joint\par Mild edema noted\par No deformity present\par Limited ROM at the PIP joint of the ring finger due to injury and pain\par FROM of the DIP and MCP joints of the right ring finger.\par Sensation is normal\par  [de-identified] : EXAM: HAND RIGHT - 11/29/21\par IMPRESSION\par Cortical irregularity suspicious for subacute-chronic fracture at the ulnar aspect of the base of the middle phalanx of the ring finger. No additional fractures are identified. Cartilage spaces are maintained. No dislocation. Mild diffuse soft tissue swelling of the hand.\par \par LATONIA VILLAREAL MD; Attending Radiologist\par This document has been electronically signed. Nov 29 2021 6:31PM

## 2021-12-06 ENCOUNTER — TRANSCRIPTION ENCOUNTER (OUTPATIENT)
Age: 26
End: 2021-12-06

## 2021-12-30 ENCOUNTER — APPOINTMENT (OUTPATIENT)
Dept: ORTHOPEDIC SURGERY | Facility: CLINIC | Age: 26
End: 2021-12-30
Payer: COMMERCIAL

## 2021-12-30 DIAGNOSIS — S62.654D NONDISPLACED FX MID PHALANX OF RT RING FINGER SUBSQ ENC FX W/ROUTINE HEALING: ICD-10-CM

## 2021-12-30 PROCEDURE — 73140 X-RAY EXAM OF FINGER(S): CPT | Mod: F8

## 2021-12-30 PROCEDURE — 99213 OFFICE O/P EST LOW 20 MIN: CPT

## 2021-12-30 NOTE — PHYSICAL EXAM
[de-identified] : Patient is WDWN, alert, and in no acute distress. Breathing is unlabored. She is grossly oriented to person, place, and time.\par \par Right Hand (ring finger):\par Tenderness to palpation over the PIP joint\par Mild edema noted\par No deformity present\par Improved ROM at the PIP joint of the ring finger since her last visit. \par FROM of the DIP and MCP joints of the right ring finger.\par Sensation is normal [de-identified] : AP, lateral and oblique views of the right ring finger were obtained today and revealed a fracture at the ulnar aspect of the base of the middle phalanx of the ring finger.		 \par \par ------------------------------------------------------------------------------------------------------------------------------------------------------------------------------------\par \par EXAM: HAND RIGHT - 11/29/21\par IMPRESSION\par Cortical irregularity suspicious for subacute-chronic fracture at the ulnar aspect of the base of the middle phalanx of the ring finger. No additional fractures are identified. Cartilage spaces are maintained. No dislocation. Mild diffuse soft tissue swelling of the hand.\par \par LATONIA VILLAREAL MD; Attending Radiologist\par This document has been electronically signed. Nov 29 2021 6:31PM.

## 2021-12-30 NOTE — END OF VISIT
[FreeTextEntry3] : All medical record entries made by the Scribe were at my,  Dr. Erick Jeffery MD., direction and personally dictated by me on 12/30/2021. I have personally reviewed the chart and agree that the record accurately reflects my personal performance of the history, physical exam, assessment and plan.

## 2021-12-30 NOTE — DISCUSSION/SUMMARY
[FreeTextEntry1] : The underlying pathophysiology was reviewed with the patient. XR films were reviewed with the patient. Discussed at length the nature of the patient’s condition. The right ring finger symptoms appear secondary to fracture at the ulnar aspect of the base of the middle phalanx.\par \par She was advised to use some caution with activity as to not reinjure the right ring finger as it is not yet fully healed.\par She may continue to randy tape the ring finger to the neighboring finger for support and protection if she has continued pain each day. She may change the tape once daily.\par \par All questions answered, understanding verbalized. Patient in agreement with plan of care. Follow up in 3 months for repeat xrays.

## 2021-12-30 NOTE — HISTORY OF PRESENT ILLNESS
[FreeTextEntry1] : THIERNO BUTLER is a 25 year old right hand dominant female. 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 She had x rays taken and was placed into an aluminum splint to treat an avulsion fracture of the ulnar base of her middle phalanx. She returns on 12/30/21 and notes improvement overall. She does have some residual pain with flexion of the finger. \par \par She was recently treated for a Right ring finger middle phalanx shaft fracture in February 2021.

## 2021-12-30 NOTE — ADDENDUM
[FreeTextEntry1] : I, Shayna Morales wrote this note acting as a scribe for Dr. Erick Jeffery on Dec 30, 2021.

## 2022-03-31 ENCOUNTER — APPOINTMENT (OUTPATIENT)
Dept: ORTHOPEDIC SURGERY | Facility: CLINIC | Age: 27
End: 2022-03-31

## 2022-04-14 ENCOUNTER — APPOINTMENT (OUTPATIENT)
Dept: ORTHOPEDIC SURGERY | Facility: CLINIC | Age: 27
End: 2022-04-14
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
## 2022-07-28 ENCOUNTER — EMERGENCY (EMERGENCY)
Facility: HOSPITAL | Age: 27
LOS: 1 days | Discharge: HOME | End: 2022-07-28
Attending: EMERGENCY MEDICINE | Admitting: EMERGENCY MEDICINE
Payer: COMMERCIAL

## 2022-07-28 VITALS
TEMPERATURE: 100 F | DIASTOLIC BLOOD PRESSURE: 97 MMHG | HEART RATE: 104 BPM | SYSTOLIC BLOOD PRESSURE: 135 MMHG | RESPIRATION RATE: 19 BRPM | HEIGHT: 61 IN | OXYGEN SATURATION: 100 % | WEIGHT: 134.92 LBS

## 2022-07-28 VITALS
OXYGEN SATURATION: 100 % | DIASTOLIC BLOOD PRESSURE: 74 MMHG | SYSTOLIC BLOOD PRESSURE: 109 MMHG | RESPIRATION RATE: 18 BRPM | HEART RATE: 72 BPM | TEMPERATURE: 98 F

## 2022-07-28 DIAGNOSIS — Z98.890 OTHER SPECIFIED POSTPROCEDURAL STATES: Chronic | ICD-10-CM

## 2022-07-28 DIAGNOSIS — Z87.42 PERSONAL HISTORY OF OTHER DISEASES OF THE FEMALE GENITAL TRACT: Chronic | ICD-10-CM

## 2022-07-28 DIAGNOSIS — Z90.89 ACQUIRED ABSENCE OF OTHER ORGANS: Chronic | ICD-10-CM

## 2022-07-28 PROCEDURE — 93010 ELECTROCARDIOGRAM REPORT: CPT

## 2022-07-28 PROCEDURE — 99283 EMERGENCY DEPT VISIT LOW MDM: CPT

## 2022-07-28 PROCEDURE — 93005 ELECTROCARDIOGRAM TRACING: CPT

## 2022-07-28 PROCEDURE — 99284 EMERGENCY DEPT VISIT MOD MDM: CPT

## 2022-07-28 RX ADMIN — Medication 0.5 MILLIGRAM(S): at 11:27

## 2022-07-28 NOTE — ED ADULT NURSE NOTE - NSICDXFAMILYHX_GEN_ALL_CORE_FT
FAMILY HISTORY:  Father  Still living? Unknown  Family history of bacterial meningitis, Age at diagnosis: Age Unknown  Family history of high cholesterol, Age at diagnosis: Age Unknown    Grandparent  Still living? Unknown  Family history of diabetes mellitus, Age at diagnosis: Age Unknown

## 2022-07-28 NOTE — ED PROVIDER NOTE - PATIENT PORTAL LINK FT
You can access the FollowMyHealth Patient Portal offered by [facility] by registering at the following website: [email or web address]. By joining [employer]’s FollowMyHealth portal, you will also be able to view your health information using other applications (apps) compatible with our system.

## 2022-07-28 NOTE — ED ADULT NURSE NOTE - NSICDXPASTSURGICALHX_GEN_ALL_CORE_FT
PAST SURGICAL HISTORY:  H/O elbow surgery left    History of termination of pregnancy 1/2017    S/P tonsillectomy 2015

## 2022-07-28 NOTE — ED PROVIDER NOTE - EYES, MLM
Massimo Olivares was seen and treated in our emergency department on 3/4/2022. He may return to work on 03/05/2022. If you have any questions or concerns, please don't hesitate to call.       Margarita Gamez, DANTE - CNP Clear bilaterally, pupils equal, round and reactive to light.

## 2022-07-28 NOTE — ED PROVIDER NOTE - NS ED ATTENDING STATEMENT MOD
This was a shared visit with the LUZMA. I reviewed and verified the documentation and independently performed the documented:

## 2022-07-28 NOTE — ED PROVIDER NOTE - CLINICAL SUMMARY MEDICAL DECISION MAKING FREE TEXT BOX
25 y/o F with h/o generalized anxiety and depression pw increased anxiety starting today at 4:30pm accompanied with left sided chest discomfort now resolved upon arrival. Currently take Lorazepam 0.5mg 2x/day given to her by her psychiatrist. States "her cat knocked over her bottle and ate the pills" and her psychiatrist cannot refill her script for another 10 days. Denies fever, chills, cough, SOB, ab pain, n/v/d, weakness, numbness, tingling. Denies thoughts of SI, hallucinations.   Plan: Will check EKG, give 1 dose Lorazepam, Psych follow up 27 y/o F with h/o generalized anxiety and depression pw increased anxiety starting today at 4:30pm accompanied with left sided chest discomfort now resolved upon arrival. Currently take Lorazepam 0.5mg 2x/day given to her by her psychiatrist. States "her cat knocked over her bottle and ate the pills" and her psychiatrist cannot refill her script for another 10 days. Denies fever, chills, cough, SOB, ab pain, n/v/d, weakness, numbness, tingling. Denies thoughts of SI, hallucinations.   Plan: Will check EKG, give 1 dose Lorazepam cannot refill rx, Psych follow up

## 2022-07-28 NOTE — ED PROVIDER NOTE - NSFOLLOWUPINSTRUCTIONS_ED_ALL_ED_FT
Anxiety    WHAT YOU NEED TO KNOW:    Anxiety is a condition that causes you to feel extremely worried or nervous. The feelings are so strong that they can cause problems with your daily activities or sleep. Anxiety may be triggered by something you fear, or it may happen without a cause. Family or work stress, smoking, caffeine, and alcohol can increase your risk for anxiety. Certain medicines or health conditions can also increase your risk. Anxiety can become a long-term condition if it is not managed or treated.    DISCHARGE INSTRUCTIONS:    Call your local emergency number (911 in the [place]) if:   •You have chest pain, tightness, or heaviness that may spread to your shoulders, arms, jaw, neck, or back.      •You feel like hurting yourself or someone else.      Call your doctor if:   •Your symptoms get worse or do not get better with treatment.      •Your anxiety keeps you from doing your regular daily activities.      •You have new symptoms since your last visit.      •You have questions or concerns about your condition or care.      Medicines:   •Medicines may be given to help you feel more calm and relaxed, and decrease your symptoms.      •Take your medicine as directed. Contact your healthcare provider if you think your medicine is not helping or if you have side effects. Tell him or her if you are allergic to any medicine. Keep a list of the medicines, vitamins, and herbs you take. Include the amounts, and when and why you take them. Bring the list or the pill bottles to follow-up visits. Carry your medicine list with you in case of an emergency.      Manage anxiety:   •Talk to someone about your anxiety. Your healthcare provider may suggest counseling. Cognitive behavioral therapy can help you understand and change how you react to events that trigger your symptoms. You might feel more comfortable talking with a friend or family member about your anxiety. Choose someone you know will be supportive and encouraging.      •Find ways to relax. Activities such as exercise, meditation, or listening to music can help you relax. Spend time with friends, or do things you enjoy.      •Practice deep breathing. Deep breathing can help you relax when you feel anxious. Focus on taking slow, deep breaths several times a day, or during an anxiety attack. Breathe in through your nose and out through your mouth.      •Create a regular sleep routine. Regular sleep can help you feel calmer during the day. Go to sleep and wake up at the same times every day. Do not watch television or use the computer right before bed. Your room should be comfortable, dark, and quiet.      •Eat a variety of healthy foods. Healthy foods include fruits, vegetables, low-fat dairy products, lean meats, fish, whole-grain breads, and cooked beans. Healthy foods can help you feel less anxious and have more energy.  Healthy Foods           •Exercise regularly. Exercise can increase your energy level. Exercise may also lift your mood and help you sleep better. Your healthcare provider can help you create an exercise plan.   Family Walking for Exercise           •Do not smoke. Nicotine and other chemicals in cigarettes and cigars can increase anxiety. Ask your healthcare provider for information if you currently smoke and need help to quit. E-cigarettes or smokeless tobacco still contain nicotine. Talk to your healthcare provider before you use these products.      •Do not have caffeine. Caffeine can make your symptoms worse. Do not have foods or drinks that are meant to increase your energy level.      •Limit or do not drink alcohol. Ask your healthcare provider if alcohol is safe for you. You may not be able to drink alcohol if you take certain anxiety or depression medicines. Limit alcohol to 1 drink per day if you are a woman. Limit alcohol to 2 drinks per day if you are a man. A drink of alcohol is 12 ounces of beer, 5 ounces of wine, or 1½ ounces of liquor.      •Do not use drugs. Drugs can make your anxiety worse. It can also make anxiety hard to manage. Talk to your healthcare provider if you use drugs and want help to quit.      Follow up with your doctor within 2 weeks or as directed: Write down your questions so you remember to ask them during your visits.

## 2022-07-28 NOTE — ED PROVIDER NOTE - ATTENDING APP SHARED VISIT CONTRIBUTION OF CARE
I, Alix Gordon MD, performed the initial face to face bedside interview with this patient regarding history of present illness, review of symptoms and relevant past medical, social and family history.  I completed an independent physical examination.  I was the initial provider who evaluated this patient. I have signed out the follow up of any pending tests (i.e. labs, radiological studies) to the ACP.  I have communicated the patient’s plan of care and disposition with the ACP.  The history, relevant review of systems, past medical and surgical history, medical decision making, and physical examination was documented by the scribe in my presence and I attest to the accuracy of the documentation.

## 2022-07-28 NOTE — ED ADULT NURSE NOTE - NSICDXPASTMEDICALHX_GEN_ALL_CORE_FT
PAST MEDICAL HISTORY:  Anxiety     Asthma mild    Depression     Finger fracture, left left 4th and 5th    PTSD (post-traumatic stress disorder)

## 2022-07-28 NOTE — ED PROVIDER NOTE - OBJECTIVE STATEMENT
25 y/o F with h/o generalized anxiety and depression pw increased anxiety starting today at 4:30pm accompanied with left sided chest discomfort now resolved upon arrival. Currently take Lorazepam 0.5mg 2x/day given to her by her psychiatrist. States "her cat knocked over her bottle and ate the pills" and her psychiatrist cannot refill her script for another 10 days. Denies fever, chills, cough, SOB, ab pain, n/v/d, weakness, numbness, tingling. Denies thoughts of SI, hallucinations.

## 2022-10-01 ENCOUNTER — EMERGENCY (EMERGENCY)
Facility: HOSPITAL | Age: 27
LOS: 1 days | Discharge: HOME | End: 2022-10-01
Attending: STUDENT IN AN ORGANIZED HEALTH CARE EDUCATION/TRAINING PROGRAM | Admitting: STUDENT IN AN ORGANIZED HEALTH CARE EDUCATION/TRAINING PROGRAM
Payer: SELF-PAY

## 2022-10-01 VITALS
SYSTOLIC BLOOD PRESSURE: 109 MMHG | DIASTOLIC BLOOD PRESSURE: 74 MMHG | RESPIRATION RATE: 16 BRPM | OXYGEN SATURATION: 99 % | HEART RATE: 72 BPM

## 2022-10-01 VITALS
DIASTOLIC BLOOD PRESSURE: 83 MMHG | WEIGHT: 132.94 LBS | OXYGEN SATURATION: 100 % | TEMPERATURE: 99 F | HEART RATE: 100 BPM | RESPIRATION RATE: 16 BRPM | SYSTOLIC BLOOD PRESSURE: 116 MMHG | HEIGHT: 61 IN

## 2022-10-01 DIAGNOSIS — Z90.89 ACQUIRED ABSENCE OF OTHER ORGANS: Chronic | ICD-10-CM

## 2022-10-01 DIAGNOSIS — Z98.890 OTHER SPECIFIED POSTPROCEDURAL STATES: Chronic | ICD-10-CM

## 2022-10-01 DIAGNOSIS — Z87.42 PERSONAL HISTORY OF OTHER DISEASES OF THE FEMALE GENITAL TRACT: Chronic | ICD-10-CM

## 2022-10-01 LAB
ALBUMIN SERPL ELPH-MCNC: 3.7 G/DL — SIGNIFICANT CHANGE UP (ref 3.3–5)
ALP SERPL-CCNC: 48 U/L — SIGNIFICANT CHANGE UP (ref 40–120)
ALT FLD-CCNC: 23 U/L — SIGNIFICANT CHANGE UP (ref 10–45)
ANION GAP SERPL CALC-SCNC: 7 MMOL/L — SIGNIFICANT CHANGE UP (ref 5–17)
APPEARANCE UR: CLEAR — SIGNIFICANT CHANGE UP
AST SERPL-CCNC: 27 U/L — SIGNIFICANT CHANGE UP (ref 10–40)
BASOPHILS # BLD AUTO: 0.08 K/UL — SIGNIFICANT CHANGE UP (ref 0–0.2)
BASOPHILS NFR BLD AUTO: 0.8 % — SIGNIFICANT CHANGE UP (ref 0–2)
BILIRUB SERPL-MCNC: 0.2 MG/DL — SIGNIFICANT CHANGE UP (ref 0.2–1.2)
BILIRUB UR-MCNC: NEGATIVE — SIGNIFICANT CHANGE UP
BUN SERPL-MCNC: 20 MG/DL — SIGNIFICANT CHANGE UP (ref 7–23)
CALCIUM SERPL-MCNC: 8.9 MG/DL — SIGNIFICANT CHANGE UP (ref 8.4–10.5)
CHLORIDE SERPL-SCNC: 108 MMOL/L — SIGNIFICANT CHANGE UP (ref 96–108)
CO2 SERPL-SCNC: 26 MMOL/L — SIGNIFICANT CHANGE UP (ref 22–31)
COLOR SPEC: YELLOW — SIGNIFICANT CHANGE UP
CREAT SERPL-MCNC: 0.95 MG/DL — SIGNIFICANT CHANGE UP (ref 0.5–1.3)
DIFF PNL FLD: NEGATIVE — SIGNIFICANT CHANGE UP
EGFR: 86 ML/MIN/1.73M2 — SIGNIFICANT CHANGE UP
EOSINOPHIL # BLD AUTO: 0.43 K/UL — SIGNIFICANT CHANGE UP (ref 0–0.5)
EOSINOPHIL NFR BLD AUTO: 4.4 % — SIGNIFICANT CHANGE UP (ref 0–6)
GLUCOSE SERPL-MCNC: 96 MG/DL — SIGNIFICANT CHANGE UP (ref 70–99)
GLUCOSE UR QL: NEGATIVE — SIGNIFICANT CHANGE UP
HCG SERPL-ACNC: <1 MIU/ML — SIGNIFICANT CHANGE UP
HCT VFR BLD CALC: 35.5 % — SIGNIFICANT CHANGE UP (ref 34.5–45)
HGB BLD-MCNC: 11.2 G/DL — LOW (ref 11.5–15.5)
IMM GRANULOCYTES NFR BLD AUTO: 0.2 % — SIGNIFICANT CHANGE UP (ref 0–0.9)
KETONES UR-MCNC: NEGATIVE — SIGNIFICANT CHANGE UP
LEUKOCYTE ESTERASE UR-ACNC: NEGATIVE — SIGNIFICANT CHANGE UP
LYMPHOCYTES # BLD AUTO: 2.13 K/UL — SIGNIFICANT CHANGE UP (ref 1–3.3)
LYMPHOCYTES # BLD AUTO: 21.6 % — SIGNIFICANT CHANGE UP (ref 13–44)
MCHC RBC-ENTMCNC: 24.8 PG — LOW (ref 27–34)
MCHC RBC-ENTMCNC: 31.5 GM/DL — LOW (ref 32–36)
MCV RBC AUTO: 78.5 FL — LOW (ref 80–100)
MONOCYTES # BLD AUTO: 0.83 K/UL — SIGNIFICANT CHANGE UP (ref 0–0.9)
MONOCYTES NFR BLD AUTO: 8.4 % — SIGNIFICANT CHANGE UP (ref 2–14)
NEUTROPHILS # BLD AUTO: 6.35 K/UL — SIGNIFICANT CHANGE UP (ref 1.8–7.4)
NEUTROPHILS NFR BLD AUTO: 64.6 % — SIGNIFICANT CHANGE UP (ref 43–77)
NITRITE UR-MCNC: NEGATIVE — SIGNIFICANT CHANGE UP
NRBC # BLD: 0 /100 WBCS — SIGNIFICANT CHANGE UP (ref 0–0)
PH UR: 8 — SIGNIFICANT CHANGE UP (ref 5–8)
PLATELET # BLD AUTO: 247 K/UL — SIGNIFICANT CHANGE UP (ref 150–400)
POTASSIUM SERPL-MCNC: 3.9 MMOL/L — SIGNIFICANT CHANGE UP (ref 3.5–5.3)
POTASSIUM SERPL-SCNC: 3.9 MMOL/L — SIGNIFICANT CHANGE UP (ref 3.5–5.3)
PROT SERPL-MCNC: 7.2 G/DL — SIGNIFICANT CHANGE UP (ref 6–8.3)
PROT UR-MCNC: NEGATIVE — SIGNIFICANT CHANGE UP
RBC # BLD: 4.52 M/UL — SIGNIFICANT CHANGE UP (ref 3.8–5.2)
RBC # FLD: 17.8 % — HIGH (ref 10.3–14.5)
SODIUM SERPL-SCNC: 141 MMOL/L — SIGNIFICANT CHANGE UP (ref 135–145)
SP GR SPEC: 1.01 — SIGNIFICANT CHANGE UP (ref 1.01–1.02)
UROBILINOGEN FLD QL: NEGATIVE — SIGNIFICANT CHANGE UP
WBC # BLD: 9.84 K/UL — SIGNIFICANT CHANGE UP (ref 3.8–10.5)
WBC # FLD AUTO: 9.84 K/UL — SIGNIFICANT CHANGE UP (ref 3.8–10.5)

## 2022-10-01 PROCEDURE — 84702 CHORIONIC GONADOTROPIN TEST: CPT

## 2022-10-01 PROCEDURE — 85025 COMPLETE CBC W/AUTO DIFF WBC: CPT

## 2022-10-01 PROCEDURE — 74177 CT ABD & PELVIS W/CONTRAST: CPT | Mod: 26,MA

## 2022-10-01 PROCEDURE — 74177 CT ABD & PELVIS W/CONTRAST: CPT | Mod: MA

## 2022-10-01 PROCEDURE — 96376 TX/PRO/DX INJ SAME DRUG ADON: CPT

## 2022-10-01 PROCEDURE — 99285 EMERGENCY DEPT VISIT HI MDM: CPT

## 2022-10-01 PROCEDURE — 36415 COLL VENOUS BLD VENIPUNCTURE: CPT

## 2022-10-01 PROCEDURE — 81025 URINE PREGNANCY TEST: CPT

## 2022-10-01 PROCEDURE — 99284 EMERGENCY DEPT VISIT MOD MDM: CPT | Mod: 25

## 2022-10-01 PROCEDURE — 96374 THER/PROPH/DIAG INJ IV PUSH: CPT | Mod: XU

## 2022-10-01 PROCEDURE — 80053 COMPREHEN METABOLIC PANEL: CPT

## 2022-10-01 RX ORDER — DIATRIZOATE MEGLUMINE 180 MG/ML
30 INJECTION, SOLUTION INTRAVESICAL ONCE
Refills: 0 | Status: COMPLETED | OUTPATIENT
Start: 2022-10-01 | End: 2022-10-01

## 2022-10-01 RX ORDER — IBUPROFEN 200 MG
1 TABLET ORAL
Qty: 28 | Refills: 0
Start: 2022-10-01 | End: 2022-10-07

## 2022-10-01 RX ORDER — METHOCARBAMOL 500 MG/1
1 TABLET, FILM COATED ORAL
Qty: 21 | Refills: 0
Start: 2022-10-01 | End: 2022-10-07

## 2022-10-01 RX ORDER — MORPHINE SULFATE 50 MG/1
2 CAPSULE, EXTENDED RELEASE ORAL ONCE
Refills: 0 | Status: DISCONTINUED | OUTPATIENT
Start: 2022-10-01 | End: 2022-10-01

## 2022-10-01 RX ORDER — SODIUM CHLORIDE 9 MG/ML
1000 INJECTION INTRAMUSCULAR; INTRAVENOUS; SUBCUTANEOUS ONCE
Refills: 0 | Status: COMPLETED | OUTPATIENT
Start: 2022-10-01 | End: 2022-10-01

## 2022-10-01 RX ORDER — LIDOCAINE 4 G/100G
1 CREAM TOPICAL ONCE
Refills: 0 | Status: COMPLETED | OUTPATIENT
Start: 2022-10-01 | End: 2022-10-01

## 2022-10-01 RX ADMIN — MORPHINE SULFATE 2 MILLIGRAM(S): 50 CAPSULE, EXTENDED RELEASE ORAL at 15:14

## 2022-10-01 RX ADMIN — LIDOCAINE 1 PATCH: 4 CREAM TOPICAL at 15:14

## 2022-10-01 RX ADMIN — MORPHINE SULFATE 2 MILLIGRAM(S): 50 CAPSULE, EXTENDED RELEASE ORAL at 15:38

## 2022-10-01 RX ADMIN — DIATRIZOATE MEGLUMINE 30 MILLILITER(S): 180 INJECTION, SOLUTION INTRAVESICAL at 15:27

## 2022-10-01 RX ADMIN — SODIUM CHLORIDE 1000 MILLILITER(S): 9 INJECTION INTRAMUSCULAR; INTRAVENOUS; SUBCUTANEOUS at 15:14

## 2022-10-01 RX ADMIN — MORPHINE SULFATE 2 MILLIGRAM(S): 50 CAPSULE, EXTENDED RELEASE ORAL at 15:39

## 2022-10-01 NOTE — ED PROVIDER NOTE - PATIENT PORTAL LINK FT
You can access the FollowMyHealth Patient Portal offered by [facility] by registering at the following website: [email or web address]. By joining [employer]’s FollowMyHealth portal, you will also be able to view your health information using other applications (apps) compatible with our system.
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
## 2022-10-01 NOTE — ED PROVIDER NOTE - PHYSICAL EXAMINATION
Msk: +mild paralumbar msk TTP b/l. no c-spine or midline spinal TTP.  pelvis stable. FROM of hips and shoulders b/l   pt ambulatory in the ED
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
## 2022-10-01 NOTE — ED ADULT TRIAGE NOTE - RESPIRATORY RATE (BREATHS/MIN)
Problem: Goal Outcome Summary  Goal: Goal Outcome Summary  Outcome: No Change  OT evaluation completed.  DIFFICULTIES: min to [facility] for ADLs  EQUIPMENT NEEDS: none at this time  D/C RECOMMENDATIONS: short-term rehab  TREATMENT D/C RECOMMENDATIONS: OT to address ADLs  COMMENTS: pt would benefit from short-term rehab               16

## 2022-10-01 NOTE — ED ADULT NURSE NOTE - BRAND OF FIRST COVID-19 BOOSTER
[Other Location: e.g. School (Enter Location, City,State)___] : at [unfilled], at the time of the visit. [Patient] : the patient [de-identified] : Ms. Dejesus is a 66 yo RHD woman who has a moving/organizing business who presents with left shoulder pain that began March 15, 2020. There was no specific injury but she does a lot of lifting for her work. Because of the coronavirus she hasn't worked since mid March and has been staying mostly at home. The pain started after her last job and not necessarily related. She woke with pain in the left shoulder from the shoulder to the elbow. She thought it would get better since she hasn't been using it but it hasn't improved.\par She had a few brief episodes of shoulder pain in the past which got better with chiropractic treatment.  She has applied heat and stretches. \par Pain is  constantly about 4/10 dull to sharp and shooting with certain movements.\par Pain is aggravated by sleeping, closing the car door, putting on deodorant, lifting and pulling.\par Nothing makes the pain better.\par She started PT 4/3020.  She was given some ROM exercises to do. She hasn't taken anything for pain. She normally just takes meds for migraines as needed. Pfizer
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
## 2022-10-01 NOTE — ED PROVIDER NOTE - PROGRESS NOTE DETAILS
Pt was holding her urine during the CT.  She urinated after the CT, post void bladder scan is Pt was holding her urine during the CT.  She urinated after the CT, post void bladder scan is 102cc

## 2022-10-02 ENCOUNTER — NON-APPOINTMENT (OUTPATIENT)
Age: 27
End: 2022-10-02

## 2022-10-14 ENCOUNTER — NON-APPOINTMENT (OUTPATIENT)
Age: 27
End: 2022-10-14

## 2022-10-20 ENCOUNTER — APPOINTMENT (OUTPATIENT)
Dept: OTOLARYNGOLOGY | Facility: CLINIC | Age: 27
End: 2022-10-20

## 2022-10-20 VITALS — BODY MASS INDEX: 24.48 KG/M2 | TEMPERATURE: 97.7 F | HEIGHT: 62 IN | WEIGHT: 133 LBS

## 2022-10-20 DIAGNOSIS — J32.0 CHRONIC MAXILLARY SINUSITIS: ICD-10-CM

## 2022-10-20 DIAGNOSIS — H69.83 OTHER SPECIFIED DISORDERS OF EUSTACHIAN TUBE, BILATERAL: ICD-10-CM

## 2022-10-20 DIAGNOSIS — H90.3 SENSORINEURAL HEARING LOSS, BILATERAL: ICD-10-CM

## 2022-10-20 DIAGNOSIS — H66.92 OTITIS MEDIA, UNSPECIFIED, LEFT EAR: ICD-10-CM

## 2022-10-20 DIAGNOSIS — J34.2 DEVIATED NASAL SEPTUM: ICD-10-CM

## 2022-10-20 DIAGNOSIS — J34.3 HYPERTROPHY OF NASAL TURBINATES: ICD-10-CM

## 2022-10-20 PROCEDURE — 92557 COMPREHENSIVE HEARING TEST: CPT

## 2022-10-20 PROCEDURE — 31231 NASAL ENDOSCOPY DX: CPT

## 2022-10-20 PROCEDURE — 92567 TYMPANOMETRY: CPT

## 2022-10-20 PROCEDURE — 99204 OFFICE O/P NEW MOD 45 MIN: CPT | Mod: 25

## 2022-10-20 RX ORDER — LORAZEPAM 0.5 MG/1
0.5 TABLET ORAL
Qty: 60 | Refills: 0 | Status: ACTIVE | COMMUNITY
Start: 2022-04-26

## 2022-10-20 RX ORDER — IBUPROFEN 600 MG/1
600 TABLET, FILM COATED ORAL
Qty: 30 | Refills: 0 | Status: ACTIVE | COMMUNITY
Start: 2022-06-22

## 2022-10-20 RX ORDER — CHLORHEXIDINE GLUCONATE, 0.12% ORAL RINSE 1.2 MG/ML
0.12 SOLUTION DENTAL
Qty: 473 | Refills: 0 | Status: ACTIVE | COMMUNITY
Start: 2022-06-22

## 2022-10-20 RX ORDER — FLUTICASONE PROPIONATE AND SALMETEROL 250; 50 UG/1; UG/1
250-50 POWDER RESPIRATORY (INHALATION)
Qty: 180 | Refills: 0 | Status: ACTIVE | COMMUNITY
Start: 2022-05-11

## 2022-10-20 RX ORDER — CEFUROXIME AXETIL 250 MG/1
250 TABLET ORAL
Qty: 20 | Refills: 0 | Status: ACTIVE | COMMUNITY
Start: 2022-06-22

## 2022-10-20 RX ORDER — IBUPROFEN 400 MG/1
400 TABLET, FILM COATED ORAL
Qty: 28 | Refills: 0 | Status: ACTIVE | COMMUNITY
Start: 2022-10-01

## 2022-10-20 RX ORDER — AZELASTINE HYDROCHLORIDE 137 UG/1
0.1 SPRAY, METERED NASAL TWICE DAILY
Qty: 1 | Refills: 1 | Status: ACTIVE | COMMUNITY
Start: 2022-10-20 | End: 1900-01-01

## 2022-10-20 RX ORDER — AMOXICILLIN AND CLAVULANATE POTASSIUM 875; 125 MG/1; MG/1
875-125 TABLET, COATED ORAL
Qty: 20 | Refills: 0 | Status: ACTIVE | COMMUNITY
Start: 2022-10-15

## 2022-10-20 RX ORDER — PREDNISONE 20 MG/1
20 TABLET ORAL
Qty: 60 | Refills: 0 | Status: ACTIVE | COMMUNITY
Start: 2022-08-06

## 2022-10-20 RX ORDER — ALBUTEROL SULFATE 90 UG/1
108 (90 BASE) INHALANT RESPIRATORY (INHALATION)
Qty: 18 | Refills: 0 | Status: ACTIVE | COMMUNITY
Start: 2022-03-16

## 2022-10-20 RX ORDER — TRAZODONE HYDROCHLORIDE 50 MG/1
50 TABLET ORAL
Qty: 30 | Refills: 0 | Status: ACTIVE | COMMUNITY
Start: 2022-04-26

## 2022-10-20 RX ORDER — METHOCARBAMOL 500 MG/1
500 TABLET, FILM COATED ORAL
Qty: 21 | Refills: 0 | Status: ACTIVE | COMMUNITY
Start: 2022-10-01

## 2022-10-20 RX ORDER — LISDEXAMFETAMINE DIMESYLATE 40 MG/1
40 CAPSULE ORAL
Qty: 30 | Refills: 0 | Status: ACTIVE | COMMUNITY
Start: 2022-05-11

## 2022-10-20 NOTE — REASON FOR VISIT
[Initial Consultation] : an initial consultation for [Other: _____] : [unfilled] [FreeTextEntry2] : ear infection

## 2022-10-20 NOTE — PHYSICAL EXAM
[Nasal Endoscopy Performed] : nasal endoscopy was performed, see procedure section for findings [] : septum deviated to the right [Midline] : trachea located in midline position [Normal] : no rashes [de-identified] : mod inferior turb hypertrophy

## 2022-10-20 NOTE — ASSESSMENT
[FreeTextEntry1] : Patient here after treatment for left OM - also occ pressure in right ear and nasal congestion.  Left ear clearing and right ear clear.  Does have dns to right (hx of prior nasal surgery 8 years ago) -  patient does have mod inf turb hypertrophy.  Feel patient has some ETD - recommended finish abx (augmetin ) and started on azelastine spray.  Follow up in 2 mos.  If still nasal issues may consider imaging and / or rhinology for NSR.   (audio today normal with C tymp left and A right ) --  no evidence of sinusitis today

## 2022-10-20 NOTE — HISTORY OF PRESENT ILLNESS
[de-identified] : Had pain in left ear several days ago.  Went to Urgent Care - told of left infection at Urgent Care- also occ clogged right ear -told of fluid by clinic - placed on augmentin.  No prior ear problems.  Had nasal surgery done about 8 years ago - had ? cartilage shaved from nose  Patient does have problems breathing thru nose.

## 2022-11-30 ENCOUNTER — APPOINTMENT (OUTPATIENT)
Dept: ORTHOPEDIC SURGERY | Facility: CLINIC | Age: 27
End: 2022-11-30

## 2022-12-12 ENCOUNTER — APPOINTMENT (OUTPATIENT)
Dept: OTOLARYNGOLOGY | Facility: CLINIC | Age: 27
End: 2022-12-12

## 2022-12-12 RX ORDER — DULOXETINE HYDROCHLORIDE 30 MG/1
30 CAPSULE, DELAYED RELEASE PELLETS ORAL
Qty: 30 | Refills: 0 | Status: ACTIVE | COMMUNITY
Start: 2022-11-29

## 2022-12-24 ENCOUNTER — NON-APPOINTMENT (OUTPATIENT)
Age: 27
End: 2022-12-24

## 2023-01-10 ENCOUNTER — NON-APPOINTMENT (OUTPATIENT)
Age: 28
End: 2023-01-10

## 2023-01-23 NOTE — ED ADULT NURSE NOTE - SUICIDE SCREENING QUESTION 1
Next colonoscopy in 2027  Please report any rectal bleeding,change in bowel habits or weight loss   Try OTC FDGard twice a day  Take PRN Zofran for nausea   No

## 2023-02-06 ENCOUNTER — NON-APPOINTMENT (OUTPATIENT)
Age: 28
End: 2023-02-06

## 2023-03-02 NOTE — BRIEF OPERATIVE NOTE - POST-OP DX
Fracture of proximal phalanx of finger  04/14/2017  left 4th finger  Active  Meet Duran Partial Purse String (Simple) Text: Given the location of the defect and the characteristics of the surrounding skin a simple purse string closure was deemed most appropriate.  Undermining was performed circumfirentially around the surgical defect.  A purse string suture was then placed and tightened. Wound tension only allowed a partial closure of the circular defect.
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
## 2023-06-23 ENCOUNTER — NON-APPOINTMENT (OUTPATIENT)
Age: 28
End: 2023-06-23

## 2023-07-08 ENCOUNTER — NON-APPOINTMENT (OUTPATIENT)
Age: 28
End: 2023-07-08

## 2023-08-29 ENCOUNTER — NON-APPOINTMENT (OUTPATIENT)
Age: 28
End: 2023-08-29

## 2023-09-29 ENCOUNTER — NON-APPOINTMENT (OUTPATIENT)
Age: 28
End: 2023-09-29

## 2023-09-29 NOTE — END OF VISIT
Huber Garvey is a 63year old female who presents today for a follow-up after undergoing delayed flap reconstruction on 7/25. Reports approxi-1 month ago, she sustained a fall and was noted to have a humeral fracture reports she is undergoing non-operative therapy. Physical Examination:  Breasts: Bilateral breast flaps are warm and soft. Incisions are clean dry and intact. The left breast was noted to have iron deficiency at the medial aspect. The right breast is noted to have mild relative ptosis. Abdomen is flat. Small amount of flank lipodystrophy is noted. There are no palpable hernias noted. Assessment and Plan:  Patient is doing well. We discussed proceeding with revision surgery once the patient has recovered from her humeral fracture. We discussed fat grafting to the left reconstructed breast, revision of the right breast with reinstating the inframammary fold and excision of the skin paddle. The nature of the procedure was reviewed with the patient. We discussed the risk of surgery including but not limited bleeding, infection with scarring, delayed wound healing, asymmetry, injury to intra-abdominal structures, cysts or calcifications requiring biopsy, and need for further surgery. We discussed the expected postoperative course including need for activity limitation, compression, and possibly drains. Multiple questions were answered to the patient and  satisfaction. No guarantees as to outcome were offered. The patient expresses understanding and wishes to proceed. [FreeTextEntry3] : All medical record entries made by the Scribe were at my,  Dr. Erick Jeffery MD., direction and personally dictated by me on 12/02/2021. I have personally reviewed the chart and agree that the record accurately reflects my personal performance of the history, physical exam, assessment and plan.

## 2024-01-10 ENCOUNTER — EMERGENCY (EMERGENCY)
Facility: HOSPITAL | Age: 29
LOS: 1 days | Discharge: HOME | End: 2024-01-10
Attending: STUDENT IN AN ORGANIZED HEALTH CARE EDUCATION/TRAINING PROGRAM
Payer: SELF-PAY

## 2024-01-10 VITALS
SYSTOLIC BLOOD PRESSURE: 123 MMHG | OXYGEN SATURATION: 96 % | DIASTOLIC BLOOD PRESSURE: 68 MMHG | HEART RATE: 86 BPM | TEMPERATURE: 98 F | RESPIRATION RATE: 20 BRPM

## 2024-01-10 VITALS
OXYGEN SATURATION: 97 % | DIASTOLIC BLOOD PRESSURE: 82 MMHG | RESPIRATION RATE: 22 BRPM | HEIGHT: 62 IN | TEMPERATURE: 98 F | WEIGHT: 154.98 LBS | SYSTOLIC BLOOD PRESSURE: 136 MMHG | HEART RATE: 118 BPM

## 2024-01-10 DIAGNOSIS — Z98.890 OTHER SPECIFIED POSTPROCEDURAL STATES: Chronic | ICD-10-CM

## 2024-01-10 DIAGNOSIS — Z90.89 ACQUIRED ABSENCE OF OTHER ORGANS: Chronic | ICD-10-CM

## 2024-01-10 DIAGNOSIS — Z87.42 PERSONAL HISTORY OF OTHER DISEASES OF THE FEMALE GENITAL TRACT: Chronic | ICD-10-CM

## 2024-01-10 LAB
FLUAV AG NPH QL: SIGNIFICANT CHANGE UP
FLUAV AG NPH QL: SIGNIFICANT CHANGE UP
FLUBV AG NPH QL: SIGNIFICANT CHANGE UP
FLUBV AG NPH QL: SIGNIFICANT CHANGE UP
RSV RNA NPH QL NAA+NON-PROBE: SIGNIFICANT CHANGE UP
RSV RNA NPH QL NAA+NON-PROBE: SIGNIFICANT CHANGE UP
SARS-COV-2 RNA SPEC QL NAA+PROBE: SIGNIFICANT CHANGE UP
SARS-COV-2 RNA SPEC QL NAA+PROBE: SIGNIFICANT CHANGE UP

## 2024-01-10 PROCEDURE — 71046 X-RAY EXAM CHEST 2 VIEWS: CPT

## 2024-01-10 PROCEDURE — 99053 MED SERV 10PM-8AM 24 HR FAC: CPT

## 2024-01-10 PROCEDURE — 99284 EMERGENCY DEPT VISIT MOD MDM: CPT

## 2024-01-10 PROCEDURE — 99285 EMERGENCY DEPT VISIT HI MDM: CPT | Mod: 25

## 2024-01-10 PROCEDURE — 71046 X-RAY EXAM CHEST 2 VIEWS: CPT | Mod: 26

## 2024-01-10 PROCEDURE — 94640 AIRWAY INHALATION TREATMENT: CPT

## 2024-01-10 PROCEDURE — 87637 SARSCOV2&INF A&B&RSV AMP PRB: CPT

## 2024-01-10 RX ORDER — IPRATROPIUM/ALBUTEROL SULFATE 18-103MCG
3 AEROSOL WITH ADAPTER (GRAM) INHALATION
Refills: 0 | Status: COMPLETED | OUTPATIENT
Start: 2024-01-10 | End: 2024-01-10

## 2024-01-10 RX ORDER — ALBUTEROL 90 UG/1
2 AEROSOL, METERED ORAL
Qty: 1 | Refills: 0
Start: 2024-01-10

## 2024-01-10 RX ORDER — ALBUTEROL 90 UG/1
3 AEROSOL, METERED ORAL
Qty: 1 | Refills: 0
Start: 2024-01-10

## 2024-01-10 RX ADMIN — Medication 3 MILLILITER(S): at 08:41

## 2024-01-10 RX ADMIN — Medication 3 MILLILITER(S): at 08:01

## 2024-01-10 RX ADMIN — Medication 60 MILLIGRAM(S): at 08:02

## 2024-01-10 RX ADMIN — Medication 3 MILLILITER(S): at 08:21

## 2024-01-10 NOTE — ED PROVIDER NOTE - CLINICAL SUMMARY MEDICAL DECISION MAKING FREE TEXT BOX
28 F w/ hx of asthma presents to the Er w/ SOOD, dry cough typical of her usual asthma sx, pt w/ no f/c/n/v. denies pregnancy, pt didn't take any home meds today, pt w/ b/l expiratory wheezing, pt to have nebs steroids and xray w/ swab for flu/covid likely TBD if w/improvement in peak flow

## 2024-01-10 NOTE — ED PROVIDER NOTE - OBJECTIVE STATEMENT
28 F w/ hx of asthma never intubated on intermittent steroids presents to the ER w/ SOOD, pt w/ mild chest tightness, and dyspnea that feels typical of her asthma, pt states "I am here for asthma" pt states that she takes albuterol and steroids as needed for her sx. pt reports that she didn't take any meds today, pt w/ no fevers, no chills, no nausea no vomiting no abd pain +dry cough, no known sick contacts, pt statesLMP 3 weeks ago, denies pregnancy

## 2024-01-10 NOTE — ED PROVIDER NOTE - NSFOLLOWUPINSTRUCTIONS_ED_ALL_ED_FT
1. Follow up with your PMD within 48-72 hours.    2. Rest, increase fluids. Take Prednisone 40mg daily for 4 days, Albuterol inhaler 2 inhalations every 4-6 hours as needed. Take all of your other medications as previously prescribed.   3. Any worsening wheezing, shortness of breath, chest pain, fever, chills return to the ED

## 2024-01-10 NOTE — ED ADULT NURSE NOTE - OBJECTIVE STATEMENT
28y Female PMHx asthma presenting to ED via walk-in triage for chest tightness and SOOD. Pt states that she usually takes albuterol and steroids as needed for symptoms with relief however did not take any meds PTA. Pt endorsing dry cough however denies fevers, chills, N/V/D, and recent illness. Pt medicated as ordered, seen and eval by MD. [place]ab in lowest position and locked, call bell within reach.

## 2024-01-10 NOTE — ED PROVIDER NOTE - PHYSICAL EXAMINATION
Const: no acute distress, Well-developed, Eyes: no conjunctival injection and no scleral icterus ENMT: Moist mucus membranes, uvula midline, no erythema,  CVS: +S1/S2, radial pulse 2+ bilaterally  RESP: b/l expiratory wheezing GI: Nontender/Nondistended soft abdomen MSK: Extremities w/o deformity or ttp, Psych: Awake, Alert, & Orientedx3;  Appropriate mood and affect, cooperative

## 2024-01-10 NOTE — ED PROVIDER NOTE - PROGRESS NOTE DETAILS
peak flow 350, pt reports feeling improved, approx 80 percent predicted will likely dc w/ outpt steroids

## 2024-01-10 NOTE — ED PROVIDER NOTE - PATIENT PORTAL LINK FT
You can access the FollowMyHealth Patient Portal offered by [facility] by registering at the following website: [email or web address]. By joining [facility]’s FollowMyHealth portal, you will also be able to view your health information using other applications (apps) compatible with our system. You can access the FollowMyHealth Patient Portal offered by [facility] by registering at the following website: [email or web address]. By joining [employer]’s FollowMyHealth portal, you will also be able to view your health information using other applications (apps) compatible with our system.

## 2024-01-10 NOTE — ED ADULT NURSE NOTE - BREATHING, MLM
JUDYM for patient letting him know that we need to get an appointment scheduled with Nikia Tony to go over his MRI results. The callback number is [phone] for callback at his convenience. Spontaneous, unlabored and symmetrical

## 2024-01-10 NOTE — ED PROVIDER NOTE - NS ED ROS FT
Constitutional: no fevers no chills.   CV: no chest pain, no palpitations   Respiratory: +shortness of breath, +cough   GI: no abdominal pain, no nausea no vomiting   Neuro: no headache, no weakness, no numbness

## 2024-01-11 ENCOUNTER — EMERGENCY (EMERGENCY)
Facility: HOSPITAL | Age: 29
LOS: 1 days | Discharge: HOME | End: 2024-01-11
Attending: EMERGENCY MEDICINE | Admitting: EMERGENCY MEDICINE
Payer: COMMERCIAL

## 2024-01-11 VITALS
HEART RATE: 101 BPM | TEMPERATURE: 98 F | OXYGEN SATURATION: 99 % | RESPIRATION RATE: 18 BRPM | DIASTOLIC BLOOD PRESSURE: 85 MMHG | SYSTOLIC BLOOD PRESSURE: 120 MMHG

## 2024-01-11 VITALS
SYSTOLIC BLOOD PRESSURE: 117 MMHG | TEMPERATURE: 98 F | RESPIRATION RATE: 22 BRPM | HEART RATE: 99 BPM | HEIGHT: 62 IN | OXYGEN SATURATION: 99 % | DIASTOLIC BLOOD PRESSURE: 87 MMHG | WEIGHT: 154.98 LBS

## 2024-01-11 DIAGNOSIS — Z90.89 ACQUIRED ABSENCE OF OTHER ORGANS: Chronic | ICD-10-CM

## 2024-01-11 DIAGNOSIS — Z98.890 OTHER SPECIFIED POSTPROCEDURAL STATES: Chronic | ICD-10-CM

## 2024-01-11 DIAGNOSIS — Z87.42 PERSONAL HISTORY OF OTHER DISEASES OF THE FEMALE GENITAL TRACT: Chronic | ICD-10-CM

## 2024-01-11 PROCEDURE — 99284 EMERGENCY DEPT VISIT MOD MDM: CPT

## 2024-01-11 PROCEDURE — 94640 AIRWAY INHALATION TREATMENT: CPT

## 2024-01-11 PROCEDURE — 99285 EMERGENCY DEPT VISIT HI MDM: CPT | Mod: 25

## 2024-01-11 PROCEDURE — 99053 MED SERV 10PM-8AM 24 HR FAC: CPT

## 2024-01-11 RX ORDER — IPRATROPIUM/ALBUTEROL SULFATE 18-103MCG
3 AEROSOL WITH ADAPTER (GRAM) INHALATION
Refills: 0 | Status: COMPLETED | OUTPATIENT
Start: 2024-01-11 | End: 2024-01-11

## 2024-01-11 RX ORDER — ALBUTEROL 90 UG/1
2 AEROSOL, METERED ORAL
Qty: 1 | Refills: 0
Start: 2024-01-11

## 2024-01-11 RX ADMIN — Medication 40 MILLIGRAM(S): at 02:37

## 2024-01-11 RX ADMIN — Medication 3 MILLILITER(S): at 03:00

## 2024-01-11 RX ADMIN — Medication 3 MILLILITER(S): at 03:31

## 2024-01-11 RX ADMIN — Medication 3 MILLILITER(S): at 02:36

## 2024-01-11 NOTE — ED ADULT TRIAGE NOTE - CHIEF COMPLAINT QUOTE
PT. C/O discharged  today earlier , came with difficulty breathing , wheezing ,  cough,  nebulizer  is not working

## 2024-01-11 NOTE — ED PROVIDER NOTE - PATIENT PORTAL LINK FT
You can access the FollowMyHealth Patient Portal offered by [facility] by registering at the following website: [email or web address]. By joining [employer]’s FollowMyHealth portal, you will also be able to view your health information using other applications (apps) compatible with our system. You can access the FollowMyHealth Patient Portal offered by [facility] by registering at the following website: [email or web address]. By joining [employer]’s FollowMyHealth portal, you will also be able to view your health information using other applications (apps) compatible with our system.

## 2024-01-11 NOTE — ED ADULT NURSE NOTE - NSFALLUNIVINTERV_ED_ALL_ED
Bed/Stretcher in lowest position, wheels locked, appropriate side rails in place/Call bell, personal items and telephone in reach/Instruct patient to call for assistance before getting out of bed/chair/stretcher/Non-slip footwear applied when patient is off stretcher/[place] to call system/Physically safe environment - no spills, clutter or unnecessary equipment/Purposeful proactive rounding/Room/bathroom lighting operational, light cord in reach Bed/Stretcher in lowest position, wheels locked, appropriate side rails in place/Call bell, personal items and telephone in reach/Instruct patient to call for assistance before getting out of bed/chair/stretcher/Non-slip footwear applied when patient is off stretcher/[place] to call system/Physically safe environment - no spills, clutter or unnecessary equipment/Purposeful proactive rounding/Room/bathroom lighting operational, light cord in reach

## 2024-01-11 NOTE — ED PROVIDER NOTE - CLINICAL SUMMARY MEDICAL DECISION MAKING FREE TEXT BOX
Patient feeling much better after nebs.  Lung sounds much improved.  Mostly clear.  Only scattered faint wheeze.  No labored breathing 28-year-old female with history of asthma complaining of difficulty breathing tonight with wheezing and tightness in chest.  Occasional cough.  No fever or chills.  No leg pain swelling or recent periods of immobilization.  No family history of PE DVT.  Patient had similar symptoms starting 2 nights ago and was seen at [facility] ER.  Had improved after nebulizers and prednisone.  Patient states symptoms recurred today and got worse tonight    Patient speaking full sentences in no significant distress.  Has diffuse moderate expiratory wheeze on exam.  Afebrile.  No hypoxia.  Consistent with acute asthma exacerbation.  Chest x-ray reviewed from yesterday morning, normal, no pneumonia.  Flu COVID RSV PCR also was negative.  Will give DuoNebs, second dose of prednisone, reassess.    Patient feeling much better after nebs.  Lung sounds much improved.  Mostly clear.  Only scattered faint wheeze.  No labored breathing.  Follow-up PMD/pulmonology.  Continue prednisone course and albuterol as needed.  Return for worsening 28-year-old female with history of asthma complaining of difficulty breathing tonight with wheezing and tightness in chest.  Occasional cough.  No fever or chills.  No leg pain swelling or recent periods of immobilization.  No family history of PE DVT.  Patient had similar symptoms starting 2 nights ago and was seen at [facility] ER.  Had improved after nebulizers and prednisone.  Patient states symptoms recurred today and got worse tonight    Patient speaking full sentences in no significant distress.  Has diffuse moderate expiratory wheeze on exam.  Afebrile.  No hypoxia.  Consistent with acute asthma exacerbation.  Chest x-ray reviewed from yesterday morning, normal, no pneumonia.  Flu COVID RSV PCR also was negative.  Will give DuoNebs, second dose of prednisone, reassess.    Patient feeling much better after nebs.  Lung sounds much improved.  Mostly clear.  Only scattered faint wheeze.  No labored breathing.  Follow-up PMD/pulmonology.  Continue prednisone course and albuterol as needed.  Return for worsening

## 2024-01-11 NOTE — ED PROVIDER NOTE - NSFOLLOWUPINSTRUCTIONS_ED_ALL_ED_FT
-Continue albuterol inhaler or nebulizer as needed as prescribed  -Continue prednisone as prescribed    Follow Up in 1-3 Days with your own doctor or with  [address] [address]  Phone: [phone]      Asthma    WHAT YOU NEED TO KNOW:    Asthma is a lung disease that makes breathing difficult. Chronic inflammation and reactions to triggers narrow the airways in the lungs. Asthma can become life-threatening if it is not managed.     DISCHARGE INSTRUCTIONS:    Call your local emergency number (911 in the [place]) if:     You have severe shortness of breath.     Your lips or nails turn blue or gray.     The skin around your neck and ribs pulls in with each breath.    You have shortness of breath, even after you take your short-term medicine as directed.     Your peak flow numbers are in the red zone of your AAP.     Call your doctor if:     You run out of medicine before your next refill is due.    Your symptoms get worse.     You need to take more medicine than usual to control your symptoms.     You have questions or concerns about your condition or care.    Medicines:     Medicines decrease inflammation, open airways, and make it easier to breathe. Medicines may be inhaled, taken as a pill, or injected. Short-term medicines relieve your symptoms quickly. Long-term medicines are used to prevent future attacks. You may also need medicine to help control your allergies. Ask your healthcare provider for more information about the medicine you are given and how to take it safely.    Take your medicine as directed. Contact your healthcare provider if you think your medicine is not helping or if you have side effects. Tell him of her if you are allergic to any medicine. Keep a list of the medicines, vitamins, and herbs you take. Include the amounts, and when and why you take them. Bring the list or the pill bottles to follow-up visits. Carry your medicine list with you in case of an emergency.    Follow up with your healthcare provider as directed: You will need to return to make sure your medicine is working and your symptoms are controlled. You may be referred to an asthma specialist. You may be asked to keep a record of your peak flow values and bring it with you to your appointments. Write down your questions so you remember to ask them during your visits.    Manage your symptoms and prevent future attacks:     Follow your Asthma Action Plan (AAP). This is a written plan that you and your healthcare provider create. It explains which medicine you need and when to change doses if necessary. It also explains how you can monitor symptoms and use a peak flow meter. The meter measures how well your lungs are working.     Manage other health conditions, such as allergies, acid reflux, and sleep apnea.     Identify and avoid triggers. These may include pets, dust mites, mold, and cockroaches.    Do not smoke or be around others who smoke. Nicotine and other chemicals in cigarettes and cigars can cause lung damage. Ask your healthcare provider for information if you currently smoke and need help to quit. E-cigarettes or smokeless tobacco still contain nicotine. Talk to your healthcare provider before you use these products.     Ask about the flu vaccine. The flu can make your asthma worse. You may need a yearly flu shot. -Continue albuterol inhaler or nebulizer as needed as prescribed  -Continue prednisone as prescribed    Follow Up in 1-3 Days with your own doctor or with  [address] [address]  Phone: [phone]      Asthma    WHAT YOU NEED TO KNOW:    Asthma is a lung disease that makes breathing difficult. Chronic inflammation and reactions to triggers narrow the airways in the lungs. Asthma can become life-threatening if it is not managed.     DISCHARGE INSTRUCTIONS:    Call your local emergency number (911 in the [place]) if:     You have severe shortness of breath.     Your lips or nails turn blue or gray.     The skin around your neck and ribs pulls in with each breath.    You have shortness of breath, even after you take your short-term medicine as directed.     Your peak flow numbers are in the red zone of your AAP.     Call your doctor if:     You run out of medicine before your next refill is due.    Your symptoms get worse.     You need to take more medicine than usual to control your symptoms.     You have questions or concerns about your condition or care.    Medicines:     Medicines decrease inflammation, open airways, and make it easier to breathe. Medicines may be inhaled, taken as a pill, or injected. Short-term medicines relieve your symptoms quickly. Long-term medicines are used to prevent future attacks. You may also need medicine to help control your allergies. Ask your healthcare provider for more information about the medicine you are given and how to take it safely.    Take your medicine as directed. Contact your healthcare provider if you think your medicine is not helping or if you have side effects. Tell him of her if you are allergic to any medicine. Keep a list of the medicines, vitamins, and herbs you take. Include the amounts, and when and why you take them. Bring the list or the pill bottles to follow-up visits. Carry your medicine list with you in case of an emergency.    Follow up with your healthcare provider as directed: You will need to return to make sure your medicine is working and your symptoms are controlled. You may be referred to an asthma specialist. You may be asked to keep a record of your peak flow values and bring it with you to your appointments. Write down your questions so you remember to ask them during your visits.    Manage your symptoms and prevent future attacks:     Follow your Asthma Action Plan (AAP). This is a written plan that you and your healthcare provider create. It explains which medicine you need and when to change doses if necessary. It also explains how you can monitor symptoms and use a peak flow meter. The meter measures how well your lungs are working.     Manage other health conditions, such as allergies, acid reflux, and sleep apnea.     Identify and avoid triggers. These may include pets, dust mites, mold, and cockroaches.    Do not smoke or be around others who smoke. Nicotine and other chemicals in cigarettes and cigars can cause lung damage. Ask your healthcare provider for information if you currently smoke and need help to quit. E-cigarettes or smokeless tobacco still contain nicotine. Talk to your healthcare provider before you use these products.     Ask about the flu vaccine. The flu can make your asthma worse. You may need a yearly flu shot.

## 2024-01-11 NOTE — ED ADULT NURSE NOTE - OBJECTIVE STATEMENT
Patient A&Ox4 came in c/o difficulty breathing, wheezing, cough. Pt was here earlier today. Hx Asthma. VSS in triage.

## 2024-01-11 NOTE — ED PROVIDER NOTE - OBJECTIVE STATEMENT
28-year-old female with history of asthma complaining of difficulty breathing tonight with wheezing and tightness in chest.  Occasional cough.  No fever or chills.  No leg pain swelling or recent periods of immobilization.  No family history of PE DVT.  Patient had similar symptoms starting 2 nights ago and was seen at [facility] ER.  Had improved after nebulizers and prednisone.  Patient states symptoms recurred today and got worse tonight 28-year-old female with history of asthma complaining of difficulty breathing tonight with wheezing and tightness in chest.  Occasional cough.  No fever or chills.  No leg pain swelling or recent periods of immobilization.  No family history of PE DVT.  Patient had similar symptoms starting 2 nights ago and was seen at [facility] ER.  Had improved after nebulizers and prednisone.  Patient states symptoms recurred today and got worse tonight

## 2024-01-11 NOTE — ED PROVIDER NOTE - PHYSICAL EXAMINATION
exam:   General: well appearing, NAD.   HEENT: eyes perrl, nose normal, OP no erythema/exudate/swelling.   cor: RRR, s1s2, 2+rad pulses.   lungs: No respiratory distress.  Speaking full sentences.  Moderate diffuse bilateral expiratory wheezes  abd: soft, ntnd.   neuro: a&ox3, cn2-12 intact, TORRES, 5/5 strength c nl sensation all extremities, nl coordination.   MSK: no extremity swelling.  Skin: normal, no rash

## 2024-01-12 NOTE — ED ADULT TRIAGE NOTE - HEART RATE (BEATS/MIN)
Internal Medicine Progress Note    Subjective      - No acute events overnight, slept well. Vitals stable overnight.  - Patient seen and examined at bedside in the morning. States that she is still having 8/10 RUQ-RLQ pain with radiation to the back. Pt describes it as pressure-like, not sharp or stabbing. Pt reports improvement to 6/10 following morphine/Tylenol administration.  - Denies fevers, chills, chest pain, shortness of breath or changes in bowel/urinary habits     Objective      VITAL SIGNS:     Vital Last Value 24 Hour Range   Temperature 97.9 °F (36.6 °C) (01/11/24 2325) Temp  Min: 97.9 °F (36.6 °C)  Max: 97.9 °F (36.6 °C)   Pulse 74 (01/12/24 0730) Pulse  Min: 57  Max: 80   Respiratory 18 (01/12/24 0730) Resp  Min: 16  Max: 20   Non-Invasive  Blood Pressure 123/64 (01/12/24 0730) BP  Min: 112/57  Max: 161/81   Pulse Oximetry 99 % (01/12/24 0730) SpO2  Min: 92 %  Max: 100 %       INTAKE/OUTPUT:      Intake/Output Summary (Last 24 hours) at 1/12/2024 0808  Last data filed at 1/12/2024 0109  Gross per 24 hour   Intake 1000 ml   Output --   Net 1000 ml       PHYSICAL EXAM    General: No acute distress, BMI 47  Lungs: Clear to auscultation bilaterally, non-labored respirations, breath sounds are equal, symmetrical chest wall expansion. No increased WOB. No crackles or wheeze noted.   Cardiac: RRR, no murmurs, rubs or gallops  Abdomen: RUQ, RLQ TTP. No CVA tenderness. Otherwise soft, no rebound, guarding, or rigidity  Neurologic: AOx4,  No obvious focal deficits. normal speech observed, normal coordination observed  Vascular: no lower extremity edema bilaterally  Skin: no rashes/lesions noted  Psychiatric: appropriate mood/affect, cooperative    LABS    Recent Labs   Lab 01/11/24 2157   SODIUM 138   POTASSIUM 3.6   CHLORIDE 104   CO2 27   ANIONGAP 11   BUN 6   CREATININE 0.57   CALCIUM 8.9   GLUCOSE 122*         Recent Labs   Lab 01/12/24  0719 01/11/24  2157   HGB 12.4 13.0   HCT 38.3 40.8    408    WBC 11.3* 11.8*   MCV 86.8 88.3         No results found      Recent Labs   Lab 01/12/24  0030   PT 10.9   PTT 29   INR 1.0         Recent Labs   Lab 01/11/24  2157   ALBUMIN 3.4*   AST 17       UA  No results found for: \"UWBC\", \"URBC\"     IMAGING    CTA CHEST PE AND CT ABD PEL W CONTRAST   Preliminary Result      Solitary segmental pulmonary embolism in the left lower lobe (series 302,   image 111). No evidence of right heart strain (RV to LV ratio less than   0.9).      No acute abnormality in the abdomen or pelvis.      Dr. Aidan Aleman notified Dr. Christianson of above findings via phone call at   4:13 a.m. on 1/12/2024.      Dictated by: AIDAN ALEMAN M.D.   Dictated on: 1/12/2024 3:52 AM          * * * * * * * * * * * * * * PRELIMINARY REPORT * * * * * * * * * * * * * *             US PELVIS NON-OB TRANSABDOMINAL AND DUPLEX   Final Result      Unremarkable pelvic ultrasound.      Dictated by: AIDAN ALEMAN M.D.   Dictated on: 1/12/2024 2:33 AM          KIM SCHMITT M.D., have reviewed the images and report and concur   with these findings interpreted by AIDAN ALEMAN M.D..      Electronically Signed by: KIM MARIO M.D.    Signed on: 1/12/2024 7:27 AM    Workstation ID: [id number]      US LIVER GALLBLADDER PANCREAS (RUQ)   Final Result      No evidence of cholecystitis.      Non-visualized common bile duct due to shadowing from overlying bowel gas.      Hepatic steatosis.      Dictated by: AIDAN ALEMAN M.D.   Dictated on: 1/12/2024 2:14 AM          KIM SCHMITT M.D., have reviewed the images and report and concur   with these findings interpreted by AIDAN ALEMAN M.D..      Electronically Signed by: KIM MARIO M.D.    Signed on: 1/12/2024 7:10 AM    Workstation ID: [id number]          Inpatient Meds  Current Facility-Administered Medications   Medication    heparin (porcine) 25,000 units/250 mL in dextrose 5 % infusion     No current outpatient medications on file.         Assessment and  Plan       Ms. Mckeon is a 30-year-old female with a clotting disorder, HTN history of multiple PEs in the past, on Eliquis and has not missed any doses, presenting to the ED with a chief complaint the R sided flank pain constant for 3-4 days now, mild to moderate constant \"pressure\" not colicky.     #Patient is a 31 yo F with a PMHx of multiple PEs, HTN and T2DM who presents with RUQ pain radiating to the back consistent with passage of renal calculi also found to have incidental L lower lobe PE on CT chest abdomen pelvis.     #RUQ pain  #Renal calculi  -Patient reports pressure-like pain reaching a 9/10 in the RUQ radiating to her R back and flank  -CT CAP from [facility] on 1/11 showed punctate nonobstructive R renal calculus  -CT CAP on 1/12 showed normal kidneys  -Urinalysis with large blood, 6-10 erythrocytes and few bacteria this AM  -Given morphine 4mg IV, acetaminophen 650mg in the ED with overall improvement in pain  Plan:  -Aggressive IV fluids  -Transition to PO pain meds     #Acute Unprovoked PE, left lower lobe  #Chronic bilateral Pes, unprovoked  -Patient diagnosed with multiple PEs bilaterally in 2021, started on Eliquis 5mg BID at this time  -Patient reports being instructed to take Eliquis 5mg once daily and was adherent to this dosing since initiation  -Not medication failure given patient was not taking therapeutic dosing  -Mild leukocytosis of 11.8 likely d/t PE  -Patient started on heparin drip in the ED  -Patient asymptomatic, denies dyspnea or CP  Plan:  -Antiphospholipid antibody panel ordered, f/u with results although unremarkable on previous studies  -TTE this AM  -Transition to PO AC  -Loading dose of Eliquis 10mg BID x 7d then return to 5mg BID  -Follow up with hematology outpatient for lifelong anticoagulation given history of multiple unprovoked PEs     #Watery Diarrhea, improving  -Patient reports 4 bouts of watery diarrhea starting yesterday, not associated with food or lower abdominal  pain  Plan:  -Stool cultures ordered, f/u with results     #Diabetes mellitus type 2  -Patient previously on glipizide 5mg, unclear if currently taking  -A1c in 2021 of 8.5  Plan:  -SSI while inpatient  -A1c ordered, f/u results with PCP     #HTN  -No longer on medications for management        Checklist:  F: LR @ 100cc/hr  E: Replenish PRN  N: Diabetic diet     DVT Prophylaxis: Heparin gtt     Dispo:  Telemetry    CODE STATUS: Full    Discussed with attending Dr. Aaron Robles MD  Internal Medicine  PGY-1   Please contact via Tame     104

## 2024-01-30 NOTE — ED PROVIDER NOTE - COVID-19 RESULT DATE/TIME
Had chest pain last Saturday. Sharp pain that lasted @ 5 hours. Went to [place] ED. They gave her a NTG on arrival and her chest pain was relieved.Testing was fine. Told to f/u with her Cardiologist. Requested records.   10-Elian-2024 09:20

## 2024-03-23 ENCOUNTER — EMERGENCY (EMERGENCY)
Facility: HOSPITAL | Age: 29
LOS: 1 days | Discharge: HOME | End: 2024-03-23
Attending: EMERGENCY MEDICINE | Admitting: EMERGENCY MEDICINE
Payer: COMMERCIAL

## 2024-03-23 VITALS
WEIGHT: 153 LBS | SYSTOLIC BLOOD PRESSURE: 118 MMHG | RESPIRATION RATE: 18 BRPM | DIASTOLIC BLOOD PRESSURE: 78 MMHG | TEMPERATURE: 98 F | HEIGHT: 62 IN | HEART RATE: 111 BPM | OXYGEN SATURATION: 99 %

## 2024-03-23 DIAGNOSIS — Z98.890 OTHER SPECIFIED POSTPROCEDURAL STATES: Chronic | ICD-10-CM

## 2024-03-23 DIAGNOSIS — Z87.42 PERSONAL HISTORY OF OTHER DISEASES OF THE FEMALE GENITAL TRACT: Chronic | ICD-10-CM

## 2024-03-23 DIAGNOSIS — Z90.89 ACQUIRED ABSENCE OF OTHER ORGANS: Chronic | ICD-10-CM

## 2024-03-23 PROCEDURE — 99284 EMERGENCY DEPT VISIT MOD MDM: CPT

## 2024-03-23 PROCEDURE — 99053 MED SERV 10PM-8AM 24 HR FAC: CPT

## 2024-03-23 RX ORDER — IPRATROPIUM/ALBUTEROL SULFATE 18-103MCG
3 AEROSOL WITH ADAPTER (GRAM) INHALATION
Refills: 0 | Status: COMPLETED | OUTPATIENT
Start: 2024-03-23 | End: 2024-03-24

## 2024-03-23 RX ADMIN — Medication 3 MILLILITER(S): at 23:38

## 2024-03-23 RX ADMIN — Medication 50 MILLIGRAM(S): at 23:38

## 2024-03-23 NOTE — ED ADULT TRIAGE NOTE - CHIEF COMPLAINT QUOTE
PT c/o having asthma attack tonight. States she was having difficulty breathing and chest tightness. Pt took Singulair and her inhaler and states she is starting to feel better.

## 2024-03-23 NOTE — ED ADULT NURSE NOTE - OBJECTIVE STATEMENT
Pt aaox4, came in to ED complaining of Asthma exacerbation that started at 2230. Pt denies sob or chest pain. Pt not sure what trigger the asthma.

## 2024-03-23 NOTE — ED ADULT NURSE NOTE - ISOLATION TYPE:
Pt here with mom , mom states pt has been having congestion ,bilateral ear pain and fever for 2 days , mom denies any sob for pt None

## 2024-03-24 VITALS
HEART RATE: 95 BPM | OXYGEN SATURATION: 99 % | RESPIRATION RATE: 17 BRPM | DIASTOLIC BLOOD PRESSURE: 74 MMHG | SYSTOLIC BLOOD PRESSURE: 116 MMHG

## 2024-03-24 PROCEDURE — 94640 AIRWAY INHALATION TREATMENT: CPT

## 2024-03-24 PROCEDURE — 99285 EMERGENCY DEPT VISIT HI MDM: CPT | Mod: 25

## 2024-03-24 RX ORDER — ALBUTEROL 90 UG/1
2 AEROSOL, METERED ORAL
Qty: 1 | Refills: 0
Start: 2024-03-24 | End: 2024-03-30

## 2024-03-24 RX ADMIN — Medication 3 MILLILITER(S): at 00:30

## 2024-03-24 RX ADMIN — Medication 3 MILLILITER(S): at 00:09

## 2024-03-24 NOTE — ED PROVIDER NOTE - CLINICAL SUMMARY MEDICAL DECISION MAKING FREE TEXT BOX
28-year-old female presents to the emergency department with an asthma exacerbation.  Reports increased wheezing.  Patient on Singulair, albuterol.  Patient notes no improvement.  No fever.  Occasional cough and tightness.  No recent travel.  No leg pain swelling.  Exam as stated. Plan for nebs and steroid.     Pt with improvement. Reexamination with clear lungs b/l.     1) Follow up with your doctor in 1-2 days  2) Return to the ER for worsening or concerning symptoms

## 2024-03-24 NOTE — ED PROVIDER NOTE - PATIENT PORTAL LINK FT
You can access the FollowMyHealth Patient Portal offered by [facility] by registering at the following website: [email or web address]. By joining [facility]’s FollowMyHealth portal, you will also be able to view your health information using other applications (apps) compatible with our system.

## 2024-03-24 NOTE — ED PROVIDER NOTE - PHYSICAL EXAMINATION
General:     NAD, well-nourished, well-appearing  Eyes: PERRL, white sclera  Head:     NC/AT, EOMI  Pharynx: pharynx wnl, oral mucosa moist  Lungs:     expiratory wheezing, moving air well, no distress.   CVS:     RRR  Skin: sun burn to upper chest and back.   Neuro: AAOx3, no sensory/motor deficits

## 2024-04-17 NOTE — ED PROVIDER NOTE - CONDITION AT DISCHARGE:
Chaperone for Intimate Exam  Chaperone was offered as part of the rooming process. Patient declined and agrees to continue with exam without a chaperone.  Chaperone: n/a       Improved

## 2024-05-28 NOTE — ED PROVIDER NOTE - NEURO NEGATIVE STATEMENT, MLM
Called pt at listed number; no answer.     
no loss of consciousness, no gait abnormality, no headache, no sensory deficits, and no weakness.